# Patient Record
Sex: MALE | Race: OTHER | HISPANIC OR LATINO | Employment: FULL TIME | URBAN - METROPOLITAN AREA
[De-identification: names, ages, dates, MRNs, and addresses within clinical notes are randomized per-mention and may not be internally consistent; named-entity substitution may affect disease eponyms.]

---

## 2018-08-29 ENCOUNTER — OFFICE VISIT (OUTPATIENT)
Dept: NEUROLOGY | Facility: CLINIC | Age: 38
End: 2018-08-29
Payer: COMMERCIAL

## 2018-08-29 ENCOUNTER — TRANSCRIBE ORDERS (OUTPATIENT)
Dept: ADMINISTRATIVE | Facility: HOSPITAL | Age: 38
End: 2018-08-29

## 2018-08-29 VITALS
DIASTOLIC BLOOD PRESSURE: 85 MMHG | SYSTOLIC BLOOD PRESSURE: 147 MMHG | HEART RATE: 82 BPM | RESPIRATION RATE: 6 BRPM | WEIGHT: 315 LBS

## 2018-08-29 DIAGNOSIS — R20.0 NUMBNESS OF LEFT HAND: ICD-10-CM

## 2018-08-29 DIAGNOSIS — G56.02 CARPAL TUNNEL SYNDROME OF LEFT WRIST: ICD-10-CM

## 2018-08-29 DIAGNOSIS — M54.2 CERVICALGIA: ICD-10-CM

## 2018-08-29 DIAGNOSIS — M54.81 OCCIPITAL NEURALGIA OF RIGHT SIDE: Primary | ICD-10-CM

## 2018-08-29 PROCEDURE — 99214 OFFICE O/P EST MOD 30 MIN: CPT | Performed by: PSYCHIATRY & NEUROLOGY

## 2018-08-29 RX ORDER — METOPROLOL TARTRATE 100 MG/1
TABLET ORAL EVERY 12 HOURS
COMMUNITY
Start: 2017-12-22 | End: 2018-10-03 | Stop reason: SDUPTHER

## 2018-08-29 RX ORDER — METHOCARBAMOL 500 MG/1
TABLET, FILM COATED ORAL
COMMUNITY
Start: 2017-12-22 | End: 2018-10-03 | Stop reason: ALTCHOICE

## 2018-08-29 RX ORDER — DICYCLOMINE HCL 20 MG
TABLET ORAL EVERY 12 HOURS
COMMUNITY
Start: 2018-05-30 | End: 2018-10-03 | Stop reason: ALTCHOICE

## 2018-08-29 RX ORDER — PANTOPRAZOLE SODIUM 40 MG/1
TABLET, DELAYED RELEASE ORAL EVERY 24 HOURS
COMMUNITY
Start: 2017-12-08 | End: 2018-10-03 | Stop reason: ALTCHOICE

## 2018-08-29 NOTE — ASSESSMENT & PLAN NOTE
Following a course of physical therapy has become essentially asymptomatic  Also pleased with his current circumstance

## 2018-08-29 NOTE — LETTER
August 29, 2018     [facility], [address] [facility]    Patient: Merlinda Rudder   YOB: 1980   Date of Visit: 8/29/2018       Dear Dr Junior Reddy:    Thank you for referring Brisa Mabry to me for evaluation  Below are my notes for this consultation  If you have questions, please do not hesitate to call me  I look forward to following your patient along with you  Sincerely,        Chele Rosa MD        CC: No Recipients  Chele Rosa MD  8/29/2018  9:41 AM  Sign at close encounter  Patient is here today for a issues with headaches      Patient ID: Merlinda Rudder is a 40 y o  male  Assessment/Plan:    Occipital neuralgia of right side  Has done exceedingly well  Able to slowly taper off the gabapentin and has been now off gabapentin for 2+ months  He has had no resurgence of pain  He is pleased with his overall improvement  --remain off gabapentin  Cervicalgia  Following a course of physical therapy has become essentially asymptomatic  Also pleased with his current circumstance  Numbness of left hand  Presents today with a new complaint  He describes intermittent numbness involving his left hand  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  No fixed sensory loss nor any testable weakness at the present time and despite an absence of mechanical signs, knee considered here at least the possibility of a left carpal tunnel syndrome  --schedule nerve conduction studies  With patient on Xarelto EMG portion obviously limited  He will follow up after completion of the electrodiagnostic study  Subjective:    HPI  Patient, a 40years of age, returns to reassess the status of 2 past complaints  The 1st is that of right occipital neuralgia  At he had been on a course of gabapentin which resulted in resolution ofhis symptoms    As a result, a tapering schedule was undertaken and he has now been off gabapentin for 2+ months  He has had no resurgence symptomatic issues  The 2nd issue is that of cervicalgia  From this standpoint he has done well  Following a course of physical therapy, he has become and has remained essentially asymptomatic from that standpoint  He is very pleased with his overall response  However, today he presents with a new issue  He describes the appearance of intermittent numbness involving his left hand (predominantly thumb and index finger) 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  If he stops what he is doing and a massages his hand, the symptoms will dissipate  He describes no static sensory issues nor has he had any evolving weakness  Past Medical History:   Diagnosis Date    Atrial fibrillation ([address] )     Blood clot in vein     Cervicalgia     GERD (gastroesophageal reflux disease)     Obstructive sleep apnea     uses CPAP    Occipital neuralgia     right    Shoulder dislocation     Splenic infarction      History reviewed  No pertinent surgical history    Social History     Social History    Marital status: Single     Spouse name: N/A    Number of children: N/A    Years of education: N/A     Social History Main Topics    Smoking status: Former Smoker    Smokeless tobacco: Former User      Comment: NEVER SMOKER; NO TOBACCO USE    Alcohol use Yes      Comment: occasional     Drug use: Yes     Types: Marijuana    Sexual activity: Not Asked     Other Topics Concern    None     Social History Narrative    None     Family History   Problem Relation Age of Onset    Bleeding Disorder Mother         BLOOD DISORDER    Heart disease Paternal Grandfather         CARDIOVASCULAR DISEASE    Cerebral aneurysm Paternal Grandfather      Allergies   Allergen Reactions    No Active Allergies        Current Outpatient Prescriptions:     aspirin 81 MG tablet, Take 81 mg by mouth, Disp: , Rfl:     dicyclomine (BENTYL) 20 mg tablet, Every 12 hours, Disp: , Rfl:     methocarbamol (ROBAXIN) 500 mg tablet, take 1 tablet by oral route 4 times every day as needed, Disp: , Rfl:     metoprolol tartrate (LOPRESSOR) 100 mg tablet, Every 12 hours, Disp: , Rfl:     pantoprazole (PROTONIX) 40 mg tablet, every 24 hours, Disp: , Rfl:     rivaroxaban (XARELTO) 20 mg tablet, every 24 hours, Disp: , Rfl:     Objective:    Blood pressure 147/85, pulse 82, resp  rate (!) 6, weight (!) 207 kg (457 lb 4 8 oz)  Physical Exam  Lungs clear to auscultation  Rhythm regular  No obvious hand atrophy noted  Specific note as to no atrophy noted within the thenar eminences bilaterally  No obvious Tinel's or Phalen's maneuvers present  Adson's maneuver negative bilaterally  Neurological Exam  Alert  Pleasantly interactive  Fully oriented  Unremarkable spontaneous gait  Cranial nerves 2-12 tested and grossly intact  Accurate finger-to-nose bilaterally  Full and symmetrical strength throughout the 4 extremities including fully retained distal median motor function bilaterally  Sensory testing with pin intact throughout including the median distributions bilaterally  Muscle stretch reflexes bilaterally 1 at biceps, brachioradialis, triceps, knees and ankles  Toe response is downgoing bilaterally  ROS:    Review of Systems   Constitutional: Negative  Negative for appetite change and fever  HENT: Negative  Negative for hearing loss, tinnitus, trouble swallowing and voice change  Eyes: Negative  Negative for photophobia and pain  Respiratory: Positive for shortness of breath  Afib   Cardiovascular: Positive for palpitations  Gastrointestinal: Negative  Negative for nausea and vomiting  Endocrine: Negative  Negative for cold intolerance and heat intolerance  Genitourinary: Positive for frequency and urgency  Negative for dysuria  Musculoskeletal: Positive for back pain, gait problem, joint swelling, neck pain and neck stiffness  Negative for myalgias  Skin: Negative  Negative for rash  Allergic/Immunologic: Negative  Neurological: Negative for dizziness, tremors, seizures, syncope, facial asymmetry, speech difficulty, weakness, light-headedness, numbness (hands, legs) and headaches  Hematological: Negative  Does not bruise/bleed easily  Psychiatric/Behavioral: Positive for sleep disturbance (at times)  Negative for confusion and hallucinations  Sleep apnea uses a CPAP       I personally reviewed the ROS that was entered by the medical assistant

## 2018-08-29 NOTE — ASSESSMENT & PLAN NOTE
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

## 2018-08-29 NOTE — ASSESSMENT & PLAN NOTE
Has done exceedingly well  Able to slowly taper off the gabapentin and has been now off gabapentin for 2+ months  He has had no resurgence of pain  He is pleased with his overall improvement  --remain off gabapentin

## 2018-08-29 NOTE — PROGRESS NOTES
Patient is here today for a issues with headaches      Patient ID: Artur Zepeda is a 40 y o  male  Assessment/Plan:    Occipital neuralgia of right side  Has done exceedingly well  Able to slowly taper off the gabapentin and has been now off gabapentin for 2+ months  He has had no resurgence of pain  He is pleased with his overall improvement  --remain off gabapentin  Cervicalgia  Following a course of physical therapy has become essentially asymptomatic  Also pleased with his current circumstance  Numbness of left hand  Presents today with a new complaint  He describes intermittent numbness involving his left hand  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  No fixed sensory loss nor any testable weakness at the present time and despite an absence of mechanical signs, knee considered here at least the possibility of a left carpal tunnel syndrome  --schedule nerve conduction studies  With patient on Xarelto EMG portion obviously limited  He will follow up after completion of the electrodiagnostic study  Subjective:    HPI  Patient, a 40years of age, returns to reassess the status of 2 past complaints  The 1st is that of right occipital neuralgia  At he had been on a course of gabapentin which resulted in resolution ofhis symptoms  As a result, a tapering schedule was undertaken and he has now been off gabapentin for 2+ months  He has had no resurgence symptomatic issues  The 2nd issue is that of cervicalgia  From this standpoint he has done well  Following a course of physical therapy, he has become and has remained essentially asymptomatic from that standpoint  He is very pleased with his overall response  However, today he presents with a new issue  He describes the appearance of intermittent numbness involving his left hand (predominantly thumb and index finger) 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    If he stops what he is doing and a massages his hand, the symptoms will dissipate  He describes no static sensory issues nor has he had any evolving weakness  Past Medical History:   Diagnosis Date    Atrial fibrillation ([address] )     Blood clot in vein     Cervicalgia     GERD (gastroesophageal reflux disease)     Obstructive sleep apnea     uses CPAP    Occipital neuralgia     right    Shoulder dislocation     Splenic infarction      History reviewed  No pertinent surgical history  Social History     Social History    Marital status: Single     Spouse name: N/A    Number of children: N/A    Years of education: N/A     Social History Main Topics    Smoking status: Former Smoker    Smokeless tobacco: Former User      Comment: NEVER SMOKER; NO TOBACCO USE    Alcohol use Yes      Comment: occasional     Drug use: Yes     Types: Marijuana    Sexual activity: Not Asked     Other Topics Concern    None     Social History Narrative    None     Family History   Problem Relation Age of Onset    Bleeding Disorder Mother         BLOOD DISORDER    Heart disease Paternal Grandfather         CARDIOVASCULAR DISEASE    Cerebral aneurysm Paternal Grandfather      Allergies   Allergen Reactions    No Active Allergies        Current Outpatient Prescriptions:     aspirin 81 MG tablet, Take 81 mg by mouth, Disp: , Rfl:     dicyclomine (BENTYL) 20 mg tablet, Every 12 hours, Disp: , Rfl:     methocarbamol (ROBAXIN) 500 mg tablet, take 1 tablet by oral route 4 times every day as needed, Disp: , Rfl:     metoprolol tartrate (LOPRESSOR) 100 mg tablet, Every 12 hours, Disp: , Rfl:     pantoprazole (PROTONIX) 40 mg tablet, every 24 hours, Disp: , Rfl:     rivaroxaban (XARELTO) 20 mg tablet, every 24 hours, Disp: , Rfl:     Objective:    Blood pressure 147/85, pulse 82, resp  rate (!) 6, weight (!) 207 kg (457 lb 4 8 oz)  Physical Exam  Lungs clear to auscultation  Rhythm regular  No obvious hand atrophy noted    Specific note as to no atrophy noted within the thenar eminences bilaterally  No obvious Tinel's or Phalen's maneuvers present  Adson's maneuver negative bilaterally  Neurological Exam  Alert  Pleasantly interactive  Fully oriented  Unremarkable spontaneous gait  Cranial nerves 2-12 tested and grossly intact  Accurate finger-to-nose bilaterally  Full and symmetrical strength throughout the 4 extremities including fully retained distal median motor function bilaterally  Sensory testing with pin intact throughout including the median distributions bilaterally  Muscle stretch reflexes bilaterally 1 at biceps, brachioradialis, triceps, knees and ankles  Toe response is downgoing bilaterally  ROS:    Review of Systems   Constitutional: Negative  Negative for appetite change and fever  HENT: Negative  Negative for hearing loss, tinnitus, trouble swallowing and voice change  Eyes: Negative  Negative for photophobia and pain  Respiratory: Positive for shortness of breath  Afib   Cardiovascular: Positive for palpitations  Gastrointestinal: Negative  Negative for nausea and vomiting  Endocrine: Negative  Negative for cold intolerance and heat intolerance  Genitourinary: Positive for frequency and urgency  Negative for dysuria  Musculoskeletal: Positive for back pain, gait problem, joint swelling, neck pain and neck stiffness  Negative for myalgias  Skin: Negative  Negative for rash  Allergic/Immunologic: Negative  Neurological: Negative for dizziness, tremors, seizures, syncope, facial asymmetry, speech difficulty, weakness, light-headedness, numbness (hands, legs) and headaches  Hematological: Negative  Does not bruise/bleed easily  Psychiatric/Behavioral: Positive for sleep disturbance (at times)  Negative for confusion and hallucinations  Sleep apnea uses a CPAP       I personally reviewed the ROS that was entered by the medical assistant

## 2018-10-03 ENCOUNTER — OFFICE VISIT (OUTPATIENT)
Dept: CARDIOLOGY CLINIC | Facility: CLINIC | Age: 38
End: 2018-10-03
Payer: COMMERCIAL

## 2018-10-03 VITALS
BODY MASS INDEX: 42.66 KG/M2 | DIASTOLIC BLOOD PRESSURE: 80 MMHG | SYSTOLIC BLOOD PRESSURE: 130 MMHG | WEIGHT: 315 LBS | OXYGEN SATURATION: 97 % | HEIGHT: 72 IN | HEART RATE: 70 BPM

## 2018-10-03 DIAGNOSIS — I48.0 PAROXYSMAL ATRIAL FIBRILLATION (HCC): ICD-10-CM

## 2018-10-03 DIAGNOSIS — I48.91 ATRIAL FIBRILLATION, UNSPECIFIED TYPE (HCC): Primary | ICD-10-CM

## 2018-10-03 PROCEDURE — 93000 ELECTROCARDIOGRAM COMPLETE: CPT | Performed by: INTERNAL MEDICINE

## 2018-10-03 PROCEDURE — 99214 OFFICE O/P EST MOD 30 MIN: CPT | Performed by: INTERNAL MEDICINE

## 2018-10-03 RX ORDER — METOPROLOL TARTRATE 100 MG/1
100 TABLET ORAL EVERY 12 HOURS SCHEDULED
Qty: 60 TABLET | Refills: 5 | Status: SHIPPED | OUTPATIENT
Start: 2018-10-03 | End: 2018-11-15 | Stop reason: SDUPTHER

## 2018-10-03 NOTE — PROGRESS NOTES
[facility]    [address] [place] [address] 809, [address]   [address]  [address]  Phone#  [phone]  Fax#  [phone]  *-*-*-*-*-*-*-*-*-*-*-*-*-*-*-*-*-*-*-*-*-*-*-*-*-*-*-*-*-*-*-*-*-*-*-*-*-*-*-*-*-*-*-*-*-*-*-*-*-*-*-*-*-*    Daron Muro DATE: 10/03/18 12:36 PM    PATIENT NAME: Viktor Murray   1980    [id number]  Age: 40 y o  Sex: male    AUTHOR: Kaylee Mcclendon MD  PRIMARYCARE PHYSICIAN: Allen Gonzalez MD    DIAGNOSES:  1  Paroxysmal atrial fibrillation with episode in 2017   2  Morbid obesity  3  Obstructive sleep apnea with intermittent compliance with CPAP  4  History of gastritis/ GERD   5  History of splenic infarction  6  History of colonic diverticular disease without history of diverticulitis  7  History of shoulder dislocation  8  History of DVT in right upper extremity, which was apparently related to bee sting    Echocardiogram January 2018 showed technically poor quality study, mild approaching moderate concentric left ventricular hypertrophy, normal left ventricular systolic function, EF around 60%, mild left atrial enlargement, aortic valve sclerosis, trace mitral and tricuspid valve regurgitation, no pulmonary hypertension  Holter monitoring evaluation in June 2018 shows sinus rhythm  beats per minute  No atrial fibrillation was noted during the recording rare isolated premature atrial and premature ventricular contractions were noted  No nonsustained or sustained arrhythmia noted  Denies smoking cigarettes  Uses marijuana  he is on Xarelto for anticoagulation for history of atrial fibrillation with history of splenic infarct and history of DVT  Has had no bleeding problems  Reports that he is mostly compliant  CURRENT ECG:  Results for orders placed or performed in visit on 10/03/18   POCT ECG    Narrative    Sinus rhythm, HR 70 beats per min, normal axis intervals, no significant ST T wave abnormalities         CARDIOLOGY ASSESSMENT & PLAN:  Paroxysmal atrial fibrillation [facility])    Patient has had 2 episodes of atrial fibrillation with last episode in 2017  Holter monitor evaluation in June 2018 did not reveal any atrial fibrillation episodes  He is symptomatic [place] better with no recent palpitations  He has reports being intermittently compliant with anticoagulation  Has had no bleeding or bruising  Is on good dose of beta blockers and tolerating it  -   At this time we are continuing current medical therapy  -   As he has had no recent atrial fibrillation and as he wants to get off anticoagulation if possible, I have advised him that we can consider to put of implantable loop recorder for long-term rhythm monitoring  He will continued to be on anticoagulation and if over the next 3-6 months of following implantation he has no atrial fibrillation episodes then we can discontinue the anticoagulation     -   Have strongly  Encouraged him about his weight management  He is interested in losing weight and has been modifying his diet but this has not helped so far  He is still very hesitant and unwilling to undergo surgical intervention  I am advising him to establish care with bariatric surgery and weight management program and they can advise him nutritional help and exercises as well  -  He is agreeable to undergo loop recorder implantation  I have discussed this procedure in detail and answered all questions are  We will approach his medical insurance for prior authorization and plan for procedure  He would like to have it done on 18th of October, Thursday at 9:30 a m  He is advised to continue all his medications  INTERVAL HISTORY & HISTORY OF PRESENT ILLNESS:  Louise Pinto is here for follow-up regarding his cardiac comorbidities which include:   Episode of atrial fibrillation in 2017, morbid obesity, hypertension and sleep apnea  He reports overall feeling well from cardiac perspective   Denies any recent chest pain, shortness of breath, dizziness, lightheadedness, orthopnea, PND, pedal edema, presyncope / syncope, or decline in exercise tolerance  Also denies any recent hospitalizations or other illnesses  Reports being compliant with medications  Did have recent URI symptoms which are now resolving  Has had no recurrence of palpitation episodes since last visit  No recurrence of blood clots  Is tolerating anticoagulation therapy with Xarelto  REVIEW OF SYMPTOMS:    Positive for:    Recent URI symptoms  No significant cardiac or other symptoms  Negative for: All remaining as reviewed below and in HPI  SYSTEM SYMPTOMS REVIEWED:  General--weight change, fever, night sweats  Respirato  Cardiovascular--chest pain, syncope, dyspnea on exertion, edema, decline in exercise tolerance, claudication   Gastrointestinal--persistent vomiting, diarrhea, abdominal distention, blood in stool   Muscular or skeletal--joint pain or swelling   Neurologic--headaches, syncope, abnormal movement  Hematologic--history of easy bruising and bleeding   Endocrine--thyroid enlargement, heat or cold intolerance, polyuria   Psychiatric--anxiety, depression     *-*-*-*-*-*-*-*-*-*-*-*-*-*-*-*-*-*-*-*-*-*-*-*-*-*-*-*-*-*-*-*-*-*-*-*-*-*-*-*-*-*-*-*-*-*-*-*-*-*-*-*-*-*-  VITAL SIGNS:  Vitals:    10/03/18 1132   BP: 130/80   BP Location: Left arm   Patient Position: Sitting   Cuff Size: Extra-Large   Pulse: 70   SpO2: 97%   Weight: (!) 206 kg (454 lb)   Height: 6' (1 829 m)       *-*-*-*-*-*-*-*-*-*-*-*-*-*-*-*-*-*-*-*-*-*-*-*-*-*-*-*-*-*-*-*-*-*-*-*-*-*-*-*-*-*-*-*-*-*-*-*-*-*-*-*-*-*-  PHYSICAL EXAM:  General Appearance:    Alert, cooperative, no distress, appears stated age , morbidly obese, smells of Marijuana   Head, Eyes, ENT:    No obvious abnormality, moist mucous mebranes  Neck:   Supple, no carotid bruit or JVD   Back:     Symmetric, no curvature  Lungs:     Respirations unlabored   Clear to auscultation bilaterally,    Chest wall:    No tenderness or deformity   Heart:    Regular rate and rhythm, S1 and S2 normal, no murmur, rub  or gallop  Abdomen:     Obese, soft, non-tender, No obvious masses, or organomegaly   Extremities:   Extremities normal, no cyanosis or edema   Skin:   Skin color, texture, turgor normal, no rashes or lesions     *-*-*-*-*-*-*-*-*-*-*-*-*-*-*-*-*-*-*-*-*-*-*-*-*-*-*-*-*-*-*-*-*-*-*-*-*-*-*-*-*-*-*-*-*-*-*-*-*-*-*-*-*-*-  CURRENT MEDICATION LIST:    Current Outpatient Prescriptions:     aspirin 81 MG tablet, Take 81 mg by mouth, Disp: , Rfl:     metoprolol tartrate (LOPRESSOR) 100 mg tablet, Every 12 hours, Disp: , Rfl:     rivaroxaban (XARELTO) 20 mg tablet, every 24 hours, Disp: , Rfl:     ALLERGIES:  Allergies   Allergen Reactions    No Active Allergies        *-*-*-*-*-*-*-*-*-*-*-*-*-*-*-*-*-*-*-*-*-*-*-*-*-*-*-*-*-*-*-*-*-*-*-*-*-*-*-*-*-*-*-*-*-*-*-*-*-*-*-*-*-*-    LABORATORY DATA:  I have personally reviewed pertinent labs  No results found for: NA, K, CL, CO2, ANIONGAP, BUN, CREATININE, EGFR, GLUCOSE, CALCIUM, AST, ALT, ALKPHOS, PROT, ALBUMIN, BILITOT, MG  No results found for: WBC, HGB, PLT  No results found for: PT, PTT, INR  No results found for: CKMB, BNP, DIGOXIN  No results found for: TSH, T4, T3  No results found for: CHOL, HDL, CHOLHDLRAT, LDL, TRIG   No results found for: HGBA1C  No results found for: BLOODCX, SPUTUMCULTUR, GRAMSTAIN, URINECX, WOUNDCULT, BODYFLUIDCUL, MRSACULTURE, INFLUAPCR, INFLUBPCR, RSVPCR, LEGIONELLAUR, CDIFFTOXINB    *-*-*-*-*-*-*-*-*-*-*-*-*-*-*-*-*-*-*-*-*-*-*-*-*-*-*-*-*-*-*-*-*-*-*-*-*-*-*-*-*-*-*-*-*-*-*-*-*-*-*-*-*-*-  PREVIOUS CARDIOLOGY & RADIOLOGY RESULTS:  No results found for this or any previous visit  No results found for this or any previous visit  No results found for this or any previous visit  No results found for this or any previous visit    Xr Spine Cervical 2 Or 3 Vw Injury    Result Date: 8/9/2016  Impression: Straightening of cervical lordosis which could be positional   Otherwise unremarkable examination  Workstation performed: [id number]        *-*-*-*-*-*-*-*-*-*-*-*-*-*-*-*-*-*-*-*-*-*-*-*-*-*-*-*-*-*-*-*-*-*-*-*-*-*-*-*-*-*-*-*-*-*-*-*-*-*-*-*-*-*-  SIGNATURES:   @JIU@   Katrin Sneed MD     CC:   Glenis Villela MD   No ref  provider found   *-*-*-*-*-*-*-*-*-*-*-*-*-*-*-*-*-*-*-*-*-*-*-*-*-*-*-*-*-*-*-*-*-*-*-*-*-*-*-*-*-*-*-*-*-*-*-*-*-*-*-*-*-*-    Social History     Social History    Marital status: Single     Spouse name: N/A    Number of children: N/A    Years of education: N/A     Occupational History    Not on file  Social History Main Topics    Smoking status: Former Smoker    Smokeless tobacco: Former User      Comment: NEVER SMOKER; NO TOBACCO USE    Alcohol use Yes      Comment: occasional     Drug use: Yes     Types: Marijuana    Sexual activity: Not on file     Other Topics Concern    Not on file     Social History Narrative    No narrative on file      Family History   Problem Relation Age of Onset    Bleeding Disorder Mother         BLOOD DISORDER    Heart disease Paternal Grandfather         CARDIOVASCULAR DISEASE    Cerebral aneurysm Paternal Grandfather      No past surgical history on file

## 2018-10-03 NOTE — ASSESSMENT & PLAN NOTE
Patient has had 2 episodes of atrial fibrillation with last episode in 2017  Holter monitor evaluation in June 2018 did not reveal any atrial fibrillation episodes  He is symptomatic [place] better with no recent palpitations  He has reports being intermittently compliant with anticoagulation  Has had no bleeding or bruising  Is on good dose of beta blockers and tolerating it  -   At this time we are continuing current medical therapy  -   As he has had no recent atrial fibrillation and as he wants to get off anticoagulation if possible, I have advised him that we can consider to put of implantable loop recorder for long-term rhythm monitoring  He will continued to be on anticoagulation and if over the next 3-6 months of following implantation he has no atrial fibrillation episodes then we can discontinue the anticoagulation     -   Have strongly  Encouraged him about his weight management  He is interested in losing weight and has been modifying his diet but this has not helped so far  He is still very hesitant and unwilling to undergo surgical intervention  I am advising him to establish care with bariatric surgery and weight management program and they can advise him nutritional help and exercises as well  -  He is agreeable to undergo loop recorder implantation  I have discussed this procedure in detail and answered all questions are  We will approach his medical insurance for prior authorization and plan for procedure  He would like to have it done on 18th of October, Thursday at 9:30 a m  He is advised to continue all his medications

## 2018-10-03 NOTE — PATIENT INSTRUCTIONS
CARDIOLOGY ASSESSMENT & PLAN:  Paroxysmal atrial fibrillation [facility])    Patient has had 2 episodes of atrial fibrillation with last episode in 2017  Holter monitor evaluation in June 2018 did not reveal any atrial fibrillation episodes  He is symptomatic [place] better with no recent palpitations  He has reports being intermittently compliant with anticoagulation  Has had no bleeding or bruising  Is on good dose of beta blockers and tolerating it  -   At this time we are continuing current medical therapy  -   As he has had no recent atrial fibrillation and as he wants to get off anticoagulation if possible, I have advised him that we can consider to put of implantable loop recorder for long-term rhythm monitoring  He will continued to be on anticoagulation and if over the next 3-6 months of following implantation he has no atrial fibrillation episodes then we can discontinue the anticoagulation     -   Have strongly  Encouraged him about his weight management  He is interested in losing weight and has been modifying his diet but this has not helped so far  He is still very hesitant and unwilling to undergo surgical intervention  I am advising him to establish care with bariatric surgery and weight management program and they can advise him nutritional help and exercises as well  -  He is agreeable to undergo loop recorder implantation  I have discussed this procedure in detail and answered all questions are  We will approach his medical insurance for prior authorization and plan for procedure  He would like to have it done on 18th of October, Thursday at 9:30 a m  He is advised to continue all his medications

## 2018-10-05 ENCOUNTER — TELEPHONE (OUTPATIENT)
Dept: CARDIOLOGY CLINIC | Facility: CLINIC | Age: 38
End: 2018-10-05

## 2018-10-05 NOTE — TELEPHONE ENCOUNTER
Called [facility] for implanted loop recorder(00060) for 10 18 18 at [facility] with Dr Emily Maxwell precert need as long as OP procedure verification use   Adiel Alston Payment   [email or web address]

## 2018-11-15 ENCOUNTER — TRANSCRIBE ORDERS (OUTPATIENT)
Dept: ADMINISTRATIVE | Facility: HOSPITAL | Age: 38
End: 2018-11-15

## 2018-11-15 DIAGNOSIS — I48.91 ATRIAL FIBRILLATION, UNSPECIFIED TYPE (HCC): Primary | ICD-10-CM

## 2018-11-15 DIAGNOSIS — R20.0 NUMBNESS OF LEFT HAND: Primary | ICD-10-CM

## 2018-11-15 RX ORDER — METOPROLOL TARTRATE 100 MG/1
100 TABLET ORAL EVERY 12 HOURS SCHEDULED
Qty: 60 TABLET | Refills: 4 | Status: SHIPPED | OUTPATIENT
Start: 2018-11-15 | End: 2020-06-11

## 2018-12-11 ENCOUNTER — OFFICE VISIT (OUTPATIENT)
Dept: CARDIOLOGY CLINIC | Facility: CLINIC | Age: 38
End: 2018-12-11
Payer: COMMERCIAL

## 2018-12-11 VITALS
WEIGHT: 315 LBS | HEIGHT: 72 IN | SYSTOLIC BLOOD PRESSURE: 144 MMHG | BODY MASS INDEX: 42.66 KG/M2 | DIASTOLIC BLOOD PRESSURE: 90 MMHG | OXYGEN SATURATION: 96 % | HEART RATE: 94 BPM

## 2018-12-11 DIAGNOSIS — E66.01 MORBID OBESITY WITH BODY MASS INDEX OF 40.0-49.9 (HCC): ICD-10-CM

## 2018-12-11 DIAGNOSIS — I48.0 PAROXYSMAL ATRIAL FIBRILLATION (HCC): ICD-10-CM

## 2018-12-11 DIAGNOSIS — I48.91 ATRIAL FIBRILLATION, UNSPECIFIED TYPE (HCC): Primary | ICD-10-CM

## 2018-12-11 DIAGNOSIS — I10 ESSENTIAL HYPERTENSION, BENIGN: ICD-10-CM

## 2018-12-11 PROCEDURE — 93000 ELECTROCARDIOGRAM COMPLETE: CPT | Performed by: INTERNAL MEDICINE

## 2018-12-11 PROCEDURE — 99214 OFFICE O/P EST MOD 30 MIN: CPT | Performed by: INTERNAL MEDICINE

## 2018-12-11 RX ORDER — LISINOPRIL 5 MG/1
5 TABLET ORAL DAILY
Qty: 30 TABLET | Refills: 5 | Status: SHIPPED | OUTPATIENT
Start: 2018-12-11 | End: 2020-06-11

## 2018-12-11 NOTE — PATIENT INSTRUCTIONS
CARDIOLOGY ASSESSMENT & PLAN:  Paroxysmal atrial fibrillation (HCC)  Last documented episode of atrial fibrillation was in 2017  Holter monitor in June 2018 did not show any AFib episodes  However given his history of prior DVT and because of his body habitus and left atrial enlargement and left ventricular hypertrophy he is at increased risk of paroxysmal atrial fibrillation  We had considered putting a loop recorder and discontinuing Xarelto if he has no atrial fibrillation over course of time however in the end we decided against it because as long as his weight does not improve dramatically he would still be at increased risk of having atrial fibrillation  He has lost significant weight but needs to lose more  His blood pressure is noted to be elevated  He continues to smoke marijuana  - at this time we will continue his metoprolol and add lisinopril 5 mg daily for additional blood pressure control   - I have reinforced him and encouraged him to continue to lose weight and eat healthy and cut down on marijuana more  - he is not interested in meeting the weight management/bariatric program   Hope we are sending a referral to [facility] weight management program   - requesting routine blood work including thyroid function test, CBC and CMP as he has not had in the recent months  Essential hypertension, benign  Blood pressure noted to be elevated  Some concerns about compliance  Plan as outlined above  DIAGNOSES:  1  Paroxysmal atrial fibrillation with episode in 2017   2  Morbid obesity  3  Obstructive sleep apnea with intermittent compliance with CPAP  4  History of gastritis/ GERD   5  History of splenic infarction  6  History of colonic diverticular disease without history of diverticulitis  7  History of shoulder dislocation  8   History of DVT in right upper extremity, which was apparently related to bee sting    Echocardiogram January 2018 showed technically poor quality study, mild approaching moderate concentric left ventricular hypertrophy, normal left ventricular systolic function, EF around 60%, mild left atrial enlargement, aortic valve sclerosis, trace mitral and tricuspid valve regurgitation, no pulmonary hypertension  Holter monitoring evaluation in June 2018 shows sinus rhythm  beats per minute  No atrial fibrillation was noted during the recording rare isolated premature atrial and premature ventricular contractions were noted  No nonsustained or sustained arrhythmia noted  Continues to smoke and use marijuana  he is on Xarelto for anticoagulation for history of atrial fibrillation with history of splenic infarct and history of DVT  Has had no bleeding problems  Reports that he is mostly compliant  CURRENT ECG:  Results for orders placed or performed in visit on 12/11/18   POCT ECG    Narrative    Sinus rhythm, HR 94 bpm, normal axis and intervals, no significant ST T wave abnormalities

## 2018-12-11 NOTE — PROGRESS NOTES
[address]    [address], [address]  [address]  Phone#  [phone]  Fax#  [phone]  *-*-*-*-*-*-*-*-*-*-*-*-*-*-*-*-*-*-*-*-*-*-*-*-*-*-*-*-*-*-*-*-*-*-*-*-*-*-*-*-*-*-*-*-*-*-*-*-*-*-*-*-*-*    Ken Velasquez DATE: 12/11/18 4:44 PM    PATIENT NAME: Pato Felix   1980    [id number]  Age: 41 y o  Sex: male    AUTHOR: Kaylee Mcclendon MD  PRIMARYCARE PHYSICIAN: Lc Faulkner MD    DIAGNOSES:  1  Paroxysmal atrial fibrillation with episode in 2017   2  Morbid obesity  3  Obstructive sleep apnea with intermittent compliance with CPAP  4  History of gastritis/ GERD   5  History of splenic infarction  6  History of colonic diverticular disease without history of diverticulitis  7  History of shoulder dislocation  8  History of DVT in right upper extremity, which was apparently related to bee sting    Echocardiogram January 2018 showed technically poor quality study, mild approaching moderate concentric left ventricular hypertrophy, normal left ventricular systolic function, EF around 60%, mild left atrial enlargement, aortic valve sclerosis, trace mitral and tricuspid valve regurgitation, no pulmonary hypertension  Holter monitoring evaluation in June 2018 shows sinus rhythm  beats per minute  No atrial fibrillation was noted during the recording rare isolated premature atrial and premature ventricular contractions were noted  No nonsustained or sustained arrhythmia noted  Continues to smoke and use marijuana  he is on Xarelto for anticoagulation for history of atrial fibrillation with history of splenic infarct and history of DVT  Has had no bleeding problems  Reports that he is mostly compliant  CURRENT ECG:  Results for orders placed or performed in visit on 12/11/18   POCT ECG    Narrative    Sinus rhythm, HR 94 bpm, normal axis and intervals, no significant ST T wave abnormalities         CARDIOLOGY ASSESSMENT & PLAN:  Paroxysmal atrial fibrillation ([facility] [address] )  Last documented episode of atrial fibrillation was in 2017  Holter monitor in June 2018 did not show any AFib episodes  However given his history of prior DVT and because of his body habitus and left atrial enlargement and left ventricular hypertrophy he is at increased risk of paroxysmal atrial fibrillation  We had considered putting a loop recorder and discontinuing Xarelto if he has no atrial fibrillation over course of time however in the end we decided against it because as long as his weight does not improve dramatically he would still be at increased risk of having atrial fibrillation  He has lost significant weight but needs to lose more  His blood pressure is noted to be elevated  He continues to smoke marijuana  - at this time we will continue his metoprolol and add lisinopril 5 mg daily for additional blood pressure control   - I have reinforced him and encouraged him to continue to lose weight and eat healthy and cut down on marijuana more  - he is not interested in meeting the weight management/bariatric program   Hope we are sending a referral to [facility] management program   - requesting routine blood work including thyroid function test, CBC and CMP as he has not had in the recent months  Essential hypertension, benign  Blood pressure noted to be elevated  Some concerns about compliance  Plan as outlined above  INTERVAL HISTORY & HISTORY OF PRESENT ILLNESS:  Carrie Harrison is here for follow-up regarding his cardiac comorbidities which include:   Episode of atrial fibrillation in 2017, morbid obesity, hypertension and sleep apnea  He reports overall feeling well from cardiac perspective  There have been no recent active symptoms of chest discomfort or exertional angina  There is no dyspnea with usual activities or exertion  No orthopnea, PND or pedal edema     No palpitations, lightheadedness, presyncope, or syncope  Denies any recent hospitalizations or other illnesses  Reports being compliant with medications  Or      Has had no recurrence of palpitation episodes since last visit  No recurrence of blood clots  Is tolerating anticoagulation therapy with Xarelto  REVIEW OF SYMPTOMS:    Positive for:  No significant cardiac or other symptoms  Negative for: All remaining as reviewed below and in HPI  SYSTEM SYMPTOMS REVIEWED:  General--weight change, fever, night sweats  Respirato  Cardiovascular--chest pain, syncope, dyspnea on exertion, edema, decline in exercise tolerance, claudication   Gastrointestinal--persistent vomiting, diarrhea, abdominal distention, blood in stool   Muscular or skeletal--joint pain or swelling   Neurologic--headaches, syncope, abnormal movement  Hematologic--history of easy bruising and bleeding   Endocrine--thyroid enlargement, heat or cold intolerance, polyuria   Psychiatric--anxiety, depression     *-*-*-*-*-*-*-*-*-*-*-*-*-*-*-*-*-*-*-*-*-*-*-*-*-*-*-*-*-*-*-*-*-*-*-*-*-*-*-*-*-*-*-*-*-*-*-*-*-*-*-*-*-*-  VITAL SIGNS:  Vitals:    12/11/18 1554   BP: 144/90   BP Location: Left arm   Patient Position: Sitting   Cuff Size: Large   Pulse: 94   SpO2: 96%   Weight: (!) 196 kg (433 lb 3 2 oz)   Height: 6' (1 829 m)     Reports that he has not taken his medications today  *-*-*-*-*-*-*-*-*-*-*-*-*-*-*-*-*-*-*-*-*-*-*-*-*-*-*-*-*-*-*-*-*-*-*-*-*-*-*-*-*-*-*-*-*-*-*-*-*-*-*-*-*-*-  PHYSICAL EXAM:  General Appearance:    Alert, cooperative, no distress, appears stated age , morbidly obese, smells of Marijuana   Head, Eyes, ENT:    No obvious abnormality, moist mucous mebranes  Neck:   Supple, no carotid bruit or JVD   Back:     Symmetric, no curvature  Lungs:     Respirations unlabored  Clear to auscultation bilaterally,    Chest wall:    No tenderness or deformity   Heart:    Regular rate and rhythm, S1 and S2 normal, no murmur, rub  or gallop     Abdomen:     Obese, soft, non-tender, No obvious masses, or organomegaly   Extremities:   Extremities normal, no cyanosis or edema   Skin:   Skin color, texture, turgor normal, no rashes or lesions     *-*-*-*-*-*-*-*-*-*-*-*-*-*-*-*-*-*-*-*-*-*-*-*-*-*-*-*-*-*-*-*-*-*-*-*-*-*-*-*-*-*-*-*-*-*-*-*-*-*-*-*-*-*-  CURRENT MEDICATION LIST:    Current Outpatient Prescriptions:     aspirin 81 MG tablet, Take 81 mg by mouth, Disp: , Rfl:     metoprolol tartrate (LOPRESSOR) 100 mg tablet, Take 1 tablet (100 mg total) by mouth every 12 (twelve) hours, Disp: 60 tablet, Rfl: 4    rivaroxaban (XARELTO) 20 mg tablet, Take 1 tablet (20 mg total) by mouth daily with breakfast, Disp: 30 tablet, Rfl: 4    lisinopril (ZESTRIL) 5 mg tablet, Take 1 tablet (5 mg total) by mouth daily, Disp: 30 tablet, Rfl: 5    ALLERGIES:  Allergies   Allergen Reactions    No Active Allergies        *-*-*-*-*-*-*-*-*-*-*-*-*-*-*-*-*-*-*-*-*-*-*-*-*-*-*-*-*-*-*-*-*-*-*-*-*-*-*-*-*-*-*-*-*-*-*-*-*-*-*-*-*-*-    LABORATORY DATA:  I have personally reviewed pertinent labs  No results found for: NA, K, CL, CO2, ANIONGAP, BUN, CREATININE, EGFR, GLUCOSE, CALCIUM, AST, ALT, ALKPHOS, PROT, BILITOT, MG  No results found for: WBC, HGB, PLT  No results found for: PT, PTT, INR  No results found for: CKMB, BNP, DIGOXIN  No results found for: TSH  No results found for: CHOL, HDL, LDL, TRIG   No results found for: HGBA1C  No results found for: BLOODCX, SPUTUMCULTUR, GRAMSTAIN, URINECX, WOUNDCULT, BODYFLUIDCUL, MRSACULTURE, INFLUAPCR, INFLUBPCR, RSVPCR, LEGIONELLAUR, CDIFFTOXINB    *-*-*-*-*-*-*-*-*-*-*-*-*-*-*-*-*-*-*-*-*-*-*-*-*-*-*-*-*-*-*-*-*-*-*-*-*-*-*-*-*-*-*-*-*-*-*-*-*-*-*-*-*-*-  PREVIOUS CARDIOLOGY & RADIOLOGY RESULTS:  No results found for this or any previous visit  No results found for this or any previous visit  No results found for this or any previous visit  No results found for this or any previous visit    Xr Spine Cervical 2 Or 3 Vw Injury    Result Date: 8/9/2016  Impression: Straightening of cervical lordosis which could be positional   Otherwise unremarkable examination  Workstation performed: [id number]        *-*-*-*-*-*-*-*-*-*-*-*-*-*-*-*-*-*-*-*-*-*-*-*-*-*-*-*-*-*-*-*-*-*-*-*-*-*-*-*-*-*-*-*-*-*-*-*-*-*-*-*-*-*-  SIGNATURES:   [unfilled]   Kaylee Mcclendon MD     CC:   MD Aneudy Ayala MD   *-*-*-*-*-*-*-*-*-*-*-*-*-*-*-*-*-*-*-*-*-*-*-*-*-*-*-*-*-*-*-*-*-*-*-*-*-*-*-*-*-*-*-*-*-*-*-*-*-*-*-*-*-*-    Social History     Social History    Marital status: Single     Spouse name: N/A    Number of children: N/A    Years of education: N/A     Occupational History    Not on file  Social History Main Topics    Smoking status: Former Smoker    Smokeless tobacco: Former User      Comment: NEVER SMOKER; NO TOBACCO USE    Alcohol use Yes      Comment: occasional     Drug use: Yes     Types: Marijuana    Sexual activity: Not on file     Other Topics Concern    Not on file     Social History Narrative    No narrative on file      Family History   Problem Relation Age of Onset    Bleeding Disorder Mother         BLOOD DISORDER    Heart disease Paternal Grandfather         CARDIOVASCULAR DISEASE    Cerebral aneurysm Paternal Grandfather      No past surgical history on file

## 2018-12-11 NOTE — ASSESSMENT & PLAN NOTE
Last documented episode of atrial fibrillation was in 2017  Holter monitor in June 2018 did not show any AFib episodes  However given his history of prior DVT and because of his body habitus and left atrial enlargement and left ventricular hypertrophy he is at increased risk of paroxysmal atrial fibrillation  We had considered putting a loop recorder and discontinuing Xarelto if he has no atrial fibrillation over course of time however in the end we decided against it because as long as his weight does not improve dramatically he would still be at increased risk of having atrial fibrillation  He has lost significant weight but needs to lose more  His blood pressure is noted to be elevated  He continues to smoke marijuana  - at this time we will continue his metoprolol and add lisinopril 5 mg daily for additional blood pressure control   - I have reinforced him and encouraged him to continue to lose weight and eat healthy and cut down on marijuana more  - he is not interested in meeting the weight management/bariatric program   Hope we are sending a referral to  [facility] weight management program   - requesting routine blood work including thyroid function test, CBC and CMP as he has not had in the recent months

## 2019-03-05 ENCOUNTER — TRANSCRIBE ORDERS (OUTPATIENT)
Dept: ADMINISTRATIVE | Facility: HOSPITAL | Age: 39
End: 2019-03-05

## 2019-03-05 ENCOUNTER — HOSPITAL ENCOUNTER (OUTPATIENT)
Dept: NEUROLOGY | Facility: HOSPITAL | Age: 39
Discharge: HOME | End: 2019-03-05
Payer: COMMERCIAL

## 2019-03-05 DIAGNOSIS — R20.0 NUMBNESS OF LEFT HAND: ICD-10-CM

## 2019-03-05 PROCEDURE — 95913 NRV CNDJ TEST 13/> STUDIES: CPT | Performed by: PSYCHIATRY & NEUROLOGY

## 2019-03-05 PROCEDURE — 95885 MUSC TST DONE W/NERV TST LIM: CPT | Performed by: PSYCHIATRY & NEUROLOGY

## 2019-03-05 NOTE — PROCEDURES
[facility]  Electromyography and Nerve Conduction Study      Patient Name:  David Zamora  MRN: [id number]   :  1980 Date performed: 3/5/2019    Age: 41 y o  Consult request by: Scot Kirkpatrick MD      HISTORY:  David Zamora is a 41 y o  male who complains of numbness and tingling in his hands bilaterally, left much worse than right  This has been present for a little less than 1 year  On the left, the paresthesias affect the dorsum of the hand as well as the 3rd through 5th digits  On the right, the paresthesias affect the 5th digit  The paresthesias are present intermittently  They do not occur nocturnally  The "flick sign" is positive  When questioned, the patient admits to neck pain that does not radiate anywhere  He denies upper extremity weakness  He admits to numbness in 1 of his great toes but otherwise denies numbness or tingling in his feet or his toes  He is referred to determine the etiology of his symptoms  Past Medical History:   Diagnosis Date    Atrial fibrillation ([facility] [address] )     Blood clot in vein     Cervicalgia     GERD (gastroesophageal reflux disease)     Obstructive sleep apnea     uses CPAP    Occipital neuralgia     right    Shoulder dislocation     Splenic infarction          Current Outpatient Medications:     aspirin 81 MG tablet, Take 81 mg by mouth, Disp: , Rfl:     lisinopril (ZESTRIL) 5 mg tablet, Take 1 tablet (5 mg total) by mouth daily, Disp: 30 tablet, Rfl: 5    metoprolol tartrate (LOPRESSOR) 100 mg tablet, Take 1 tablet (100 mg total) by mouth every 12 (twelve) hours, Disp: 60 tablet, Rfl: 4    rivaroxaban (XARELTO) 20 mg tablet, Take 1 tablet (20 mg total) by mouth daily with breakfast, Disp: 30 tablet, Rfl: 4    PHYSICAL EXAMINATION:  In general, he was in no acute distress  Upper extremity power was grade 5 bilaterally, including thumb abduction bilaterally    In both upper extremities, pinprick sensation was equivalent between the 2nd and 5th digits  Phalen's maneuver was negative bilaterally  Upper extremity reflexes were bilaterally symmetric and within normal limits  RESULTS:  EMG/NCS data and waveforms that were performed for this study have been scanned into Epic  Please refer to scanned document for waveforms  IMPRESSION:   This is an abnormal study  There is clinical and electrophysiologic evidence of a mild median neuropathy at the left wrist (e g  carpal tunnel syndrome), without electrophysiologic evidence of motor or sensory axon loss  There is no electrophysiologic evidence of a median neuropathy at the right wrist, despite detailed testing  Clinical correlation is advised      Jina Cabrera MD

## 2019-04-03 ENCOUNTER — CONSULT (OUTPATIENT)
Dept: BARIATRICS | Facility: CLINIC | Age: 39
End: 2019-04-03
Payer: COMMERCIAL

## 2019-04-03 VITALS
BODY MASS INDEX: 42.66 KG/M2 | WEIGHT: 315 LBS | SYSTOLIC BLOOD PRESSURE: 130 MMHG | RESPIRATION RATE: 20 BRPM | TEMPERATURE: 98.4 F | DIASTOLIC BLOOD PRESSURE: 100 MMHG | HEIGHT: 72 IN | HEART RATE: 80 BPM

## 2019-04-03 DIAGNOSIS — I48.0 PAROXYSMAL ATRIAL FIBRILLATION (HCC): ICD-10-CM

## 2019-04-03 DIAGNOSIS — I10 HYPERTENSION, BENIGN: ICD-10-CM

## 2019-04-03 DIAGNOSIS — I42.9 SECONDARY CARDIOMYOPATHY (HCC): ICD-10-CM

## 2019-04-03 DIAGNOSIS — G47.33 OSA (OBSTRUCTIVE SLEEP APNEA): ICD-10-CM

## 2019-04-03 DIAGNOSIS — E66.01 MORBID OBESITY WITH BODY MASS INDEX OF 40.0-49.9 (HCC): Primary | ICD-10-CM

## 2019-04-03 DIAGNOSIS — K21.9 GASTROESOPHAGEAL REFLUX DISEASE, ESOPHAGITIS PRESENCE NOT SPECIFIED: ICD-10-CM

## 2019-04-03 PROBLEM — D73.5 SPLENIC INFARCTION: Status: ACTIVE | Noted: 2018-05-30

## 2019-04-03 PROCEDURE — 99243 OFF/OP CNSLTJ NEW/EST LOW 30: CPT | Performed by: FAMILY MEDICINE

## 2019-10-24 ENCOUNTER — OFFICE VISIT (OUTPATIENT)
Dept: URGENT CARE | Age: 39
End: 2019-10-24
Payer: COMMERCIAL

## 2019-10-24 VITALS
SYSTOLIC BLOOD PRESSURE: 179 MMHG | TEMPERATURE: 99.2 F | HEIGHT: 70 IN | HEART RATE: 90 BPM | RESPIRATION RATE: 18 BRPM | BODY MASS INDEX: 45.1 KG/M2 | OXYGEN SATURATION: 95 % | DIASTOLIC BLOOD PRESSURE: 98 MMHG | WEIGHT: 315 LBS

## 2019-10-24 DIAGNOSIS — H66.91 RIGHT OTITIS MEDIA, UNSPECIFIED OTITIS MEDIA TYPE: Primary | ICD-10-CM

## 2019-10-24 DIAGNOSIS — I10 ESSENTIAL HYPERTENSION: ICD-10-CM

## 2019-10-24 PROCEDURE — 99284 EMERGENCY DEPT VISIT MOD MDM: CPT | Performed by: NURSE PRACTITIONER

## 2019-10-24 PROCEDURE — 99204 OFFICE O/P NEW MOD 45 MIN: CPT | Performed by: NURSE PRACTITIONER

## 2019-10-24 PROCEDURE — G0383 LEV 4 HOSP TYPE B ED VISIT: HCPCS | Performed by: NURSE PRACTITIONER

## 2019-10-24 RX ORDER — AMOXICILLIN AND CLAVULANATE POTASSIUM 875; 125 MG/1; MG/1
1 TABLET, FILM COATED ORAL EVERY 12 HOURS SCHEDULED
Qty: 14 TABLET | Refills: 0 | Status: SHIPPED | OUTPATIENT
Start: 2019-10-24 | End: 2019-10-31

## 2019-10-24 NOTE — PATIENT INSTRUCTIONS
Take zyrtec, allegra, or Claritin daily  Use flonase 1-2 sprays in each nare daily   Use nasal saline to the nose,   Use humidifer in room  Symptoms worsen go to ER  Rest    Follow up with pcp  Spoke about his elevated BP today, increased weight gain of 20 pounds since 12/2018, and severity of his comorbidities  Pt aware to follow up with his pcp, cardiologist and go to the ER if symptoms of HA, dizziness, CP and SOB occur  Otitis Media   WHAT YOU NEED TO KNOW:   Otitis media is an ear infection  DISCHARGE INSTRUCTIONS:   Medicines:  · Ibuprofen or acetaminophen  helps decrease your pain and fever  They are available without a doctor's order  Ask your healthcare provider which medicine is right for you  Ask how much to take and how often to take it  These medicines can cause stomach bleeding if not taken correctly  Ibuprofen can cause kidney damage  Do not take ibuprofen if you have kidney disease, an ulcer, or allergies to aspirin  Acetaminophen can cause liver damage  Do not drink alcohol if you take acetaminophen  · Ear drops  help treat your ear pain  · Antibiotics  help treat a bacterial infection that caused your ear infection  · Take your medicine as directed  Contact your healthcare provider if you think your medicine is not helping or if you have side effects  Tell him or her if you are allergic to any medicine  Keep a list of the medicines, vitamins, and herbs you take  Include the amounts, and when and why you take them  Bring the list or the pill bottles to follow-up visits  Carry your medicine list with you in case of an emergency  Heat or ice:   · Heat  may be used to decrease your pain  Place a warm, moist washcloth on your ear  Apply for 15 to 20 minutes, 3 to 4 times a day    · Ice  helps decrease swelling and pain  Use an ice pack or put crushed ice in a plastic bag   Cover the ice pack with a towel and place it on your ear for 15 to 20 minutes, 3 to 4 times a day for 2 days   Prevent otitis media:   · Wash your hands often  Use soap and water  Wash your hands after you use the bathroom, change a child's diapers, or sneeze  Wash your hands before you prepare or eat food  · Stay away from people who are ill  Some germs are easily and quickly spread through contact  Return to work or school: You may return to work or school when your fever is gone  Follow up with your healthcare provider as directed:  Write down your questions so you remember to ask them during your visits  Contact your healthcare provider if:   · Your ear pain gets worse or does not go away, even after treatment  · The outside of your ear is red or swollen  · You have vomiting or diarrhea  · You have fluid coming from your ear  · You have questions or concerns about your condition or care  Return to the emergency department if:   · You have a seizure  · You have a fever and a stiff neck  © 2017 [employer]  Information is for End User's use only and may not be sold, redistributed or otherwise used for commercial purposes  All illustrations and images included in CareNotes® are the copyrighted property of [employer]  or [employer]  The above information is an  only  It is not intended as medical advice for individual conditions or treatments  Talk to your doctor, nurse or pharmacist before following any medical regimen to see if it is safe and effective for you  We offer over-the-counter meds that can be purchased here at the office for your convenience       Cough and Cold Meds:   Mucinex for $14 00   Mucinex DM for $14 00   Delsym for $14 dollars   Cepacol Extra strength for $4 00,     Allergy Medicine  Bendaryl for $4 00  Cetrizine (Zyrtec) for $4 00,     Pain Relief  Ibuprofen (motrin) for $4 00  Acetaminophen (tylenol) for $4 00  Childrens tylenol and motrin for $4 00    Itch and Rash Relief  % Hydrocortisone Cream for $4 00

## 2019-10-24 NOTE — PROGRESS NOTES
NAME: Yahaira Braun is a 46 y o  male  : 1980    MRN: [id number]    BP (!) 179/98   Pulse 90   Temp 99 2 °F (37 3 °C)   Resp 18   Ht 5' 10" (1 778 m)   Wt (!) 206 kg (454 lb)   SpO2 95%   BMI 65 14 kg/m²     Assessment and Plan   Right otitis media, unspecified otitis media type [H66 91]  1  Right otitis media, unspecified otitis media type  amoxicillin-clavulanate (AUGMENTIN) 875-125 mg per tablet   2  Essential hypertension         Edwige Su was seen today for earache  Diagnoses and all orders for this visit:    Right otitis media, unspecified otitis media type  -     amoxicillin-clavulanate (AUGMENTIN) 875-125 mg per tablet; Take 1 tablet by mouth every 12 (twelve) hours for 7 days    Essential hypertension        Patient Instructions   Patient Instructions   Take zyrtec, allegra, or Claritin daily  Use flonase 1-2 sprays in each nare daily   Use nasal saline to the nose,   Use humidifer in room  Symptoms worsen go to ER  Rest    Follow up with pcp  Spoke about his elevated BP today, increased weight gain of 20 pounds since 2018, and severity of his comorbidities  Pt aware to follow up with his pcp, cardiologist and go to the ER if symptoms of HA, dizziness, CP and SOB occur  Otitis Media   WHAT YOU NEED TO KNOW:   Otitis media is an ear infection  DISCHARGE INSTRUCTIONS:   Medicines:  · Ibuprofen or acetaminophen  helps decrease your pain and fever  They are available without a doctor's order  Ask your healthcare provider which medicine is right for you  Ask how much to take and how often to take it  These medicines can cause stomach bleeding if not taken correctly  Ibuprofen can cause kidney damage  Do not take ibuprofen if you have kidney disease, an ulcer, or allergies to aspirin  Acetaminophen can cause liver damage  Do not drink alcohol if you take acetaminophen  · Ear drops  help treat your ear pain      · Antibiotics  help treat a bacterial infection that caused your ear infection  · Take your medicine as directed  Contact your healthcare provider if you think your medicine is not helping or if you have side effects  Tell him or her if you are allergic to any medicine  Keep a list of the medicines, vitamins, and herbs you take  Include the amounts, and when and why you take them  Bring the list or the pill bottles to follow-up visits  Carry your medicine list with you in case of an emergency  Heat or ice:   · Heat  may be used to decrease your pain  Place a warm, moist washcloth on your ear  Apply for 15 to 20 minutes, 3 to 4 times a day    · Ice  helps decrease swelling and pain  Use an ice pack or put crushed ice in a plastic bag  Cover the ice pack with a towel and place it on your ear for 15 to 20 minutes, 3 to 4 times a day for 2 days  Prevent otitis media:   · Wash your hands often  Use soap and water  Wash your hands after you use the bathroom, change a child's diapers, or sneeze  Wash your hands before you prepare or eat food  · Stay away from people who are ill  Some germs are easily and quickly spread through contact  Return to work or school: You may return to work or school when your fever is gone  Follow up with your healthcare provider as directed:  Write down your questions so you remember to ask them during your visits  Contact your healthcare provider if:   · Your ear pain gets worse or does not go away, even after treatment  · The outside of your ear is red or swollen  · You have vomiting or diarrhea  · You have fluid coming from your ear  · You have questions or concerns about your condition or care  Return to the emergency department if:   · You have a seizure  · You have a fever and a stiff neck  © 2017 [employer] Information is for End User's use only and may not be sold, redistributed or otherwise used for commercial purposes   All illustrations and images included in CareNotes® are the copyrighted property of [employer]  or [employer] Burrows  The above information is an  only  It is not intended as medical advice for individual conditions or treatments  Talk to your doctor, nurse or pharmacist before following any medical regimen to see if it is safe and effective for you  We offer over-the-counter meds that can be purchased here at the office for your convenience  Cough and Cold Meds:   Mucinex for $14 00   Mucinex DM for $14 00   Delsym for $14 dollars   Cepacol Extra strength for $4 00,     Allergy Medicine  Bendaryl for $4 00  Cetrizine (Zyrtec) for $4 00,     Pain Relief  Ibuprofen (motrin) for $4 00  Acetaminophen (tylenol) for $4 00  Childrens tylenol and motrin for $4 00    Itch and Rash Relief  % Hydrocortisone Cream for $4 00        Proceed to ER if symptoms worsen  Chief Complaint     Chief Complaint   Patient presents with   Will Marks     pt reports right ear pain that started 1 hour ago  Pain radiates down neck  Pt did not treat w/ OTC pain rlievers  History of Present Illness     42 yo male here today for ear pain that started one hr ago  He has had a cold for the last past few days and has taken nothing for the symptoms  He has had ear infections in the past and last one being years ago  Earache    There is pain in the right ear  This is a new problem  The current episode started today  The problem occurs constantly (for the past one hr)  The problem has been rapidly worsening  There has been no fever  The pain is at a severity of 9/10  The pain is severe  Associated symptoms include headaches  Pertinent negatives include no coughing, diarrhea, ear discharge, neck pain, rash or sore throat  He has tried acetaminophen for the symptoms  The treatment provided no relief  There is no history of a chronic ear infection, hearing loss or a tympanostomy tube  Review of Systems   Review of Systems   HENT: Positive for ear pain   Negative for ear discharge and sore throat  Respiratory: Negative for cough  Gastrointestinal: Negative for diarrhea  Musculoskeletal: Negative for neck pain  Skin: Negative for rash  Neurological: Positive for headaches  Current Medications       Current Outpatient Medications:     aspirin 81 MG tablet, Take 81 mg by mouth, Disp: , Rfl:     amoxicillin-clavulanate (AUGMENTIN) 875-125 mg per tablet, Take 1 tablet by mouth every 12 (twelve) hours for 7 days, Disp: 14 tablet, Rfl: 0    lisinopril (ZESTRIL) 5 mg tablet, Take 1 tablet (5 mg total) by mouth daily (Patient not taking: Reported on 10/24/2019), Disp: 30 tablet, Rfl: 5    metoprolol tartrate (LOPRESSOR) 100 mg tablet, Take 1 tablet (100 mg total) by mouth every 12 (twelve) hours (Patient not taking: Reported on 10/24/2019), Disp: 60 tablet, Rfl: 4    rivaroxaban (XARELTO) 20 mg tablet, Take 1 tablet (20 mg total) by mouth daily with breakfast (Patient not taking: Reported on 10/24/2019), Disp: 30 tablet, Rfl: 4    Current Allergies     Allergies as of 10/24/2019 - Reviewed 10/24/2019   Allergen Reaction Noted    No active allergies  07/05/2018              Past Medical History:   Diagnosis Date    Atrial fibrillation ([facility] [address] )     Blood clot in vein     Cervicalgia     Depression     GERD (gastroesophageal reflux disease)     Obstructive sleep apnea     uses CPAP    Occipital neuralgia     right    Shoulder dislocation     Splenic infarction        History reviewed  No pertinent surgical history  Family History   Problem Relation Age of Onset    Bleeding Disorder Mother         BLOOD DISORDER    Heart disease Paternal Grandfather         CARDIOVASCULAR DISEASE    Cerebral aneurysm Paternal Grandfather          Medications have been verified      The following portions of the patient's history were reviewed and updated as appropriate: allergies, current medications, past family history, past medical history, past social history, past surgical history and problem list     Objective   BP (!) 179/98   Pulse 90   Temp 99 2 °F (37 3 °C)   Resp 18   Ht 5' 10" (1 778 m)   Wt (!) 206 kg (454 lb)   SpO2 95%   BMI 65 14 kg/m²      Physical Exam     Physical Exam   Constitutional: He is oriented to person, place, and time  HENT:   Head: Normocephalic  Right Ear: Hearing normal  There is tenderness  No foreign bodies  Tympanic membrane is injected, erythematous and bulging  Left Ear: Hearing, tympanic membrane, external ear and ear canal normal    Nose: Mucosal edema present  Mouth/Throat: Mucous membranes are normal  Posterior oropharyngeal edema present  Tonsils are 0 on the right  Tonsils are 0 on the left  No tonsillar exudate  Neck: Trachea normal, normal range of motion and full passive range of motion without pain  Carotid bruit is not present  No thyroid mass present  Pain intermittently radiates down the right side of the neck, no fluid form the right ear, pt has not seen pcp in years, pt is morbidly obese and aware of risks of being overweight and cardiac effects  Cardiovascular: Regular rhythm, normal heart sounds and normal pulses  Pt aware of risk of having cardiac events due to poor health compliance  BP is elevated  Pulmonary/Chest: Effort normal and breath sounds normal    Neurological: He is alert and oriented to person, place, and time     verbalized all understanding of risks of having poor health compliance    Michelle GEORGE Tinoco

## 2020-03-11 ENCOUNTER — OFFICE VISIT (OUTPATIENT)
Dept: URGENT CARE | Age: 40
End: 2020-03-11
Payer: COMMERCIAL

## 2020-03-11 VITALS
RESPIRATION RATE: 18 BRPM | BODY MASS INDEX: 42.66 KG/M2 | SYSTOLIC BLOOD PRESSURE: 178 MMHG | TEMPERATURE: 98.7 F | HEIGHT: 72 IN | DIASTOLIC BLOOD PRESSURE: 99 MMHG | WEIGHT: 315 LBS | OXYGEN SATURATION: 98 % | HEART RATE: 83 BPM

## 2020-03-11 DIAGNOSIS — I10 ESSENTIAL HYPERTENSION: ICD-10-CM

## 2020-03-11 DIAGNOSIS — S39.011A STRAIN OF ABDOMINAL WALL, INITIAL ENCOUNTER: Primary | ICD-10-CM

## 2020-03-11 PROCEDURE — G0382 LEV 3 HOSP TYPE B ED VISIT: HCPCS | Performed by: PHYSICIAN ASSISTANT

## 2020-03-11 PROCEDURE — 99213 OFFICE O/P EST LOW 20 MIN: CPT | Performed by: PHYSICIAN ASSISTANT

## 2020-03-11 PROCEDURE — 99283 EMERGENCY DEPT VISIT LOW MDM: CPT | Performed by: PHYSICIAN ASSISTANT

## 2020-03-11 NOTE — PROGRESS NOTES
[facility]        NAME: Uzma Wilkinson is a 46 y o  male  : 1980    MRN: [id number]  DATE: 2020  TIME: 12:46 PM    Assessment and Plan   Strain of abdominal wall, initial encounter [S39 011A]  1  Strain of abdominal wall, initial encounter     2  Essential hypertension           Patient Instructions     Follow up with PCP in 3-5 days  Proceed to  ER if symptoms worsen  Chief Complaint     Chief Complaint   Patient presents with    Abdominal Pain     yesterday, pt states was laying in bed, rolled over onto left side and felt a pop in LUQ  states dull pain today  denies N/V/D  History of Present Illness       44-year-old male with complaints of abdominal pain  He states at rest when he is completely still he has 0 pain  He states with any deep breathing or movement his pain increases  He states yesterday morning he was laying in bed on his right side and turned over onto his back and felt a pain and popping sensation in his left upper abdomen  He states the pain has improved since yesterday  He denies any fever, chills, sweats, difficulty eating or drinking, shortness of breath or chest pain  Patient was concerned about possible hernia  Review of Systems   Review of Systems   Constitutional: Negative  HENT: Negative  Respiratory: Negative  Cardiovascular: Negative  Gastrointestinal: Positive for abdominal pain  Negative for constipation, nausea and vomiting  Genitourinary: Negative  Skin: Negative            Current Medications       Current Outpatient Medications:     aspirin 81 MG tablet, Take 81 mg by mouth, Disp: , Rfl:     lisinopril (ZESTRIL) 5 mg tablet, Take 1 tablet (5 mg total) by mouth daily (Patient not taking: Reported on 10/24/2019), Disp: 30 tablet, Rfl: 5    metoprolol tartrate (LOPRESSOR) 100 mg tablet, Take 1 tablet (100 mg total) by mouth every 12 (twelve) hours (Patient not taking: Reported on 10/24/2019), Disp: 60 tablet, Rfl: 4    rivaroxaban (XARELTO) 20 mg tablet, Take 1 tablet (20 mg total) by mouth daily with breakfast (Patient not taking: Reported on 10/24/2019), Disp: 30 tablet, Rfl: 4    Current Allergies     Allergies as of 03/11/2020 - Reviewed 03/11/2020   Allergen Reaction Noted    No active allergies  07/05/2018            The following portions of the patient's history were reviewed and updated as appropriate: allergies, current medications, past family history, past medical history, past social history, past surgical history and problem list     Objective   BP (!) 178/99 (BP Location: Left arm, Patient Position: Sitting, Cuff Size: Adult) Comment (BP Location): forearm  Pulse 83   Temp 98 7 °F (37 1 °C) (Tympanic)   Resp 18   Ht 6' (1 829 m)   Wt (!) 213 kg (469 lb)   SpO2 98%   BMI 63 61 kg/m²        Physical Exam     Physical Exam   Constitutional: He appears well-developed and well-nourished  No distress  Cardiovascular: Normal rate and regular rhythm  Pulmonary/Chest: Effort normal and breath sounds normal    Abdominal: Soft  Normal appearance and bowel sounds are normal  There is tenderness in the left upper quadrant  There is no rigidity, no rebound, no guarding, no tenderness at McBurney's point and negative Lorenzo's sign  No hernia  Nursing note and vitals reviewed

## 2020-03-11 NOTE — PATIENT INSTRUCTIONS
Abdominal wall muscle strain:  Advised patient to use heat, Tylenol in any worsening of pain, fever inability to eat and drink to the emergency room    Hypertension:  Please take blood pressure medication and follow up with your PCP as soon as possible

## 2020-06-11 ENCOUNTER — OFFICE VISIT (OUTPATIENT)
Dept: CARDIOLOGY CLINIC | Facility: CLINIC | Age: 40
End: 2020-06-11
Payer: COMMERCIAL

## 2020-06-11 VITALS
BODY MASS INDEX: 42.66 KG/M2 | SYSTOLIC BLOOD PRESSURE: 126 MMHG | WEIGHT: 315 LBS | TEMPERATURE: 99.7 F | HEART RATE: 100 BPM | HEIGHT: 72 IN | DIASTOLIC BLOOD PRESSURE: 70 MMHG

## 2020-06-11 DIAGNOSIS — I48.91 ATRIAL FIBRILLATION, UNSPECIFIED TYPE (HCC): ICD-10-CM

## 2020-06-11 DIAGNOSIS — D73.5 SPLENIC INFARCTION: ICD-10-CM

## 2020-06-11 DIAGNOSIS — I10 ESSENTIAL HYPERTENSION, BENIGN: ICD-10-CM

## 2020-06-11 DIAGNOSIS — Q21.1 PFO (PATENT FORAMEN OVALE): ICD-10-CM

## 2020-06-11 DIAGNOSIS — I10 HYPERTENSION, BENIGN: ICD-10-CM

## 2020-06-11 DIAGNOSIS — I48.0 PAROXYSMAL ATRIAL FIBRILLATION (HCC): ICD-10-CM

## 2020-06-11 DIAGNOSIS — E66.01 MORBID OBESITY WITH BODY MASS INDEX OF 40.0-49.9 (HCC): Primary | ICD-10-CM

## 2020-06-11 DIAGNOSIS — G47.33 OSA (OBSTRUCTIVE SLEEP APNEA): ICD-10-CM

## 2020-06-11 PROCEDURE — 99215 OFFICE O/P EST HI 40 MIN: CPT | Performed by: INTERNAL MEDICINE

## 2020-06-11 PROCEDURE — 93000 ELECTROCARDIOGRAM COMPLETE: CPT | Performed by: INTERNAL MEDICINE

## 2020-06-11 RX ORDER — METOPROLOL TARTRATE 50 MG/1
100 TABLET, FILM COATED ORAL EVERY 12 HOURS SCHEDULED
Qty: 60 TABLET | Refills: 6 | Status: SHIPPED | OUTPATIENT
Start: 2020-06-11

## 2020-06-11 RX ORDER — LOSARTAN POTASSIUM 25 MG/1
25 TABLET ORAL DAILY
Qty: 30 TABLET | Refills: 6 | Status: SHIPPED | OUTPATIENT
Start: 2020-06-11

## 2020-06-12 ENCOUNTER — TELEPHONE (OUTPATIENT)
Dept: CARDIOLOGY CLINIC | Facility: CLINIC | Age: 40
End: 2020-06-12

## 2020-07-21 ENCOUNTER — OFFICE VISIT (OUTPATIENT)
Dept: URGENT CARE | Age: 40
End: 2020-07-21
Payer: COMMERCIAL

## 2020-07-21 VITALS
DIASTOLIC BLOOD PRESSURE: 90 MMHG | TEMPERATURE: 98.6 F | SYSTOLIC BLOOD PRESSURE: 200 MMHG | OXYGEN SATURATION: 98 % | HEART RATE: 90 BPM | RESPIRATION RATE: 18 BRPM

## 2020-07-21 DIAGNOSIS — M79.89 LEG SWELLING: Primary | ICD-10-CM

## 2020-07-21 DIAGNOSIS — M54.42 ACUTE LEFT-SIDED LOW BACK PAIN WITH LEFT-SIDED SCIATICA: ICD-10-CM

## 2020-07-21 DIAGNOSIS — M54.32 SCIATICA OF LEFT SIDE: ICD-10-CM

## 2020-07-21 PROCEDURE — 99283 EMERGENCY DEPT VISIT LOW MDM: CPT | Performed by: PHYSICIAN ASSISTANT

## 2020-07-21 PROCEDURE — 99213 OFFICE O/P EST LOW 20 MIN: CPT | Performed by: PHYSICIAN ASSISTANT

## 2020-07-21 PROCEDURE — G0382 LEV 3 HOSP TYPE B ED VISIT: HCPCS | Performed by: PHYSICIAN ASSISTANT

## 2020-07-21 NOTE — PATIENT INSTRUCTIONS
Leg Edema   WHAT YOU NEED TO KNOW:   Leg edema is swelling caused by fluid buildup  Your legs may swell if you sit or stand for long periods of time, are pregnant, or are injured  Swelling may also occur if you have heart failure or circulation problems  This means that your heart does not pump blood through your body as it should  DISCHARGE INSTRUCTIONS:   Self-care:   · Elevate your legs:  Raise your legs above the level of your heart as often as you can  This will help decrease swelling and pain  Prop your legs on pillows or blankets to keep them elevated comfortably  · Wear pressure stockings: These tight stockings put pressure on your legs to promote blood flow and prevent blood clots  Wear the stockings during the day  Do not wear them while you sleep  · Apply heat:  Heat helps decrease pain and swelling  Apply heat on the area for 20 to 30 minutes every 2 hours for as many days as directed  · Stay active:  Do not stand or sit for long periods of time  Ask your healthcare provider about the best exercise plan for you  · Eat healthy foods:  Healthy foods include fruits, vegetables, whole-grain breads, low-fat dairy products, beans, lean meats, and fish  Ask if you need to be on a special diet  Limit salt  Salt will make your body hold even more fluid  Follow up with your healthcare provider as directed:  Write down your questions so you remember to ask them during your visits  Contact your healthcare provider if:   · You have a fever or feel more tired than usual     · The veins in your legs look larger than usual  They may look full or bulging  · Your legs itch or feel heavy  · You have red or white areas or sores on your legs  The skin may also appear dimpled or have indentations  · You are gaining weight  · You have trouble moving your ankles  · The swelling does not go away, or other parts of your body swell      · You have questions or concerns about your condition or care   Return to the emergency department if:   · You cannot walk  · You feel faint or confused  · Your skin turns blue or gray  · Your leg feels warm, tender, and painful  It may be swollen and red  · You have chest pain or trouble breathing that is worse when you lie down  · You suddenly feel lightheaded and have trouble breathing  · You have new and sudden chest pain  You may have more pain when you take deep breaths or cough  You may also cough up blood  © 2017 [employer]  Information is for End User's use only and may not be sold, redistributed or otherwise used for commercial purposes  All illustrations and images included in CareNotes® are the copyrighted property of [employer]  or [employer] Burrows  The above information is an  only  It is not intended as medical advice for individual conditions or treatments  Talk to your doctor, nurse or pharmacist before following any medical regimen to see if it is safe and effective for you

## 2020-07-21 NOTE — PROGRESS NOTES
[facility]        NAME: Lucy Montoya is a 46 y o  male  : 1980    MRN: [id number]  DATE: 2020  TIME: 5:39 PM    BP (!) 200/90   Pulse 90   Temp 98 6 °F (37 °C)   Resp 18   SpO2 98%     Assessment and Plan   Leg swelling [M79 89]  1  Leg swelling     2  Acute left-sided low back pain with left-sided sciatica     3  Sciatica of left side           Patient Instructions       Follow up with PCP in 3-5 days  Proceed to  ER if symptoms worsen  Chief Complaint     Chief Complaint   Patient presents with    Leg Pain     Pt c/o left leg pain, swelling and numbness x two weeks  Pt referred to ED by LEAH Chance  History of Present Illness       Pt with 2 week history of left leg pain and swelling worse over past few days     Leg Pain    The incident occurred more than 1 week ago  The incident occurred at home  There was no injury mechanism  The pain is present in the left leg  The pain is at a severity of 5/10  The pain is moderate  The pain has been constant since onset  He reports no foreign bodies present  Review of Systems   Review of Systems   Constitutional: Negative  HENT: Negative  Eyes: Negative  Respiratory: Negative  Cardiovascular: Negative  Gastrointestinal: Negative  Endocrine: Negative  Genitourinary: Negative  Musculoskeletal: Negative  Skin: Negative  Allergic/Immunologic: Negative  Neurological: Negative  Hematological: Negative  Psychiatric/Behavioral: Negative  All other systems reviewed and are negative          Current Medications       Current Outpatient Medications:     aspirin 81 MG tablet, Take 81 mg by mouth, Disp: , Rfl:     losartan (COZAAR) 25 mg tablet, Take 1 tablet (25 mg total) by mouth daily, Disp: 30 tablet, Rfl: 6    metoprolol tartrate (LOPRESSOR) 50 mg tablet, Take 2 tablets (100 mg total) by mouth every 12 (twelve) hours, Disp: 60 tablet, Rfl: 6    Current Allergies     Allergies as of 07/21/2020 - Reviewed 07/21/2020   Allergen Reaction Noted    No active allergies  07/05/2018            The following portions of the patient's history were reviewed and updated as appropriate: allergies, current medications, past family history, past medical history, past social history, past surgical history and problem list      Past Medical History:   Diagnosis Date    Atrial fibrillation ([address] )     Blood clot in vein     Cervicalgia     Depression     GERD (gastroesophageal reflux disease)     Obstructive sleep apnea     uses CPAP    Occipital neuralgia     right    Shoulder dislocation     Splenic infarction        History reviewed  No pertinent surgical history  Family History   Problem Relation Age of Onset    Bleeding Disorder Mother         BLOOD DISORDER    Heart disease Paternal Grandfather         CARDIOVASCULAR DISEASE    Cerebral aneurysm Paternal Grandfather          Medications have been verified  Objective   BP (!) 200/90   Pulse 90   Temp 98 6 °F (37 °C)   Resp 18   SpO2 98%        Physical Exam     Physical Exam   Constitutional: He is oriented to person, place, and time  He appears well-developed and well-nourished  Discussed with pt need for ultrasound for evaluation for dvt   HENT:   Head: Normocephalic and atraumatic  Right Ear: External ear normal    Left Ear: External ear normal    Nose: Nose normal    Mouth/Throat: Oropharynx is clear and moist    Eyes: Pupils are equal, round, and reactive to light  Conjunctivae and EOM are normal    Neck: Normal range of motion  Neck supple  Cardiovascular: Normal rate, regular rhythm and normal heart sounds  Pulmonary/Chest: Effort normal and breath sounds normal    Abdominal: Soft  Bowel sounds are normal    Musculoskeletal: Normal range of motion     Left lower leg  +3 edema  Tenderness to calf  Entire left lower leg swelling no erythema   dp pulse strong ankle and toes from   Sensation wnl     Left lower back pain left sciatic notch pain    Neurological: He is alert and oriented to person, place, and time  Skin: Skin is warm  Nursing note and vitals reviewed

## 2021-03-21 ENCOUNTER — OFFICE VISIT (OUTPATIENT)
Dept: URGENT CARE | Age: 41
End: 2021-03-21
Payer: COMMERCIAL

## 2021-03-21 VITALS
TEMPERATURE: 97.5 F | WEIGHT: 315 LBS | HEIGHT: 72 IN | RESPIRATION RATE: 18 BRPM | HEART RATE: 69 BPM | OXYGEN SATURATION: 97 % | BODY MASS INDEX: 42.66 KG/M2

## 2021-03-21 DIAGNOSIS — R60.0 SWELLING OF SALIVARY GLAND: Primary | ICD-10-CM

## 2021-03-21 PROCEDURE — 99283 EMERGENCY DEPT VISIT LOW MDM: CPT | Performed by: PHYSICIAN ASSISTANT

## 2021-03-21 PROCEDURE — G0382 LEV 3 HOSP TYPE B ED VISIT: HCPCS | Performed by: PHYSICIAN ASSISTANT

## 2021-03-21 PROCEDURE — 99213 OFFICE O/P EST LOW 20 MIN: CPT | Performed by: PHYSICIAN ASSISTANT

## 2021-03-21 NOTE — PATIENT INSTRUCTIONS
Apply warm compress to affected area 3-5 times daily for 15 minutes  Use sialogogues, anything that will increase saliva production usually lemon drops or other hard sour candy  2-3 times daily  If symptoms fail to improve in 2-3 days follow-up with primary care provider  If symptoms worsen or you become ill report to the emergency room  Sialoadenitis   AMBULATORY CARE:   Sialoadenitis  is an inflammation or infection of one or more of your salivary glands  A small stone can block the salivary gland and cause inflammation  Infection may be caused by a virus or bacteria  You can develop sialoadenitis on one or both sides of your face  Common symptoms include the following:   · Pain and swelling of a salivary gland, especially during or right after eating    · Bad breath or tooth pain    · Pus in your mouth    · Fever    Seek care immediately if:   · You have trouble breathing or swallowing because of swelling  Call your doctor if:   · You have trouble opening your mouth because of swelling  · Your salivary gland gets more red and hot or drains more pus  · The pain and swelling do not go away within 2 days, or they get worse  · Your mouth is very dry  · You lose movement on one side of your face  · You have questions about your condition or care  Treatment  may include any of the following:  · Medicines:      ? Antibiotics  may be given to treat a bacterial infection  ? Acetaminophen  decreases pain and fever  It is available without a doctor's order  Ask how much to give your child and how often to give it  Follow directions  Read the labels of all other medicines your child uses to see if they also contain acetaminophen, or ask your child's doctor or pharmacist  Acetaminophen can cause liver damage if not taken correctly  ? NSAIDs , such as ibuprofen, help decrease swelling, pain, and fever  This medicine is available with or without a doctor's order   NSAIDs can cause stomach bleeding or kidney problems in certain people  If you take blood thinner medicine, always ask your healthcare provider if NSAIDs are safe for you  Always read the medicine label and follow directions  ? Take your medicine as directed  Contact your healthcare provider if you think your medicine is not helping or if you have side effects  Tell him or her if you are allergic to any medicine  Keep a list of the medicines, vitamins, and herbs you take  Include the amounts, and when and why you take them  Bring the list or the pill bottles to follow-up visits  Carry your medicine list with you in case of an emergency  · Procedures:      ? Removal of one or more stones  may be needed if other treatments do not work  ? An incision and drainage  may be needed if there is an abscess (pocket of pus) that does not respond to other treatments  Manage or prevent sialoadenitis:   · Drink liquids as directed  You may need to drink more liquids than usual  Ask how much liquid to drink each day and which liquids are best for you  Good choices of liquids for most people include water, tea, soup, juice, or milk  · Practice good oral care  Brush your teeth 2 times a day, 1 time in the morning and 1 time in the evening  Use a fluoride toothpaste  Floss your teeth 1 time each day, usually in the evening  Use mouthwash after you floss  Swish it around in your mouth for 30 seconds and spit it out  · Keep your mouth moist   Suck on hard candy or chew sugarless gum to get your saliva flowing  Sour and tart flavors such as lemon and orange will help get saliva to flow  This will help keep your mouth moist and help push out a stone blocking your salivary duct  · Apply a warm, wet cloth and massage  the swollen area as directed  This may help relieve swelling and pain by pushing the pus out of the gland      Follow up with your doctor or dentist as directed:  Write down your questions so you remember to ask them during your visits  © Copyright [address] Information is for End User's use only and may not be sold, redistributed or otherwise used for commercial purposes  All illustrations and images included in CareNotes® are the copyrighted property of [employer]  or Jillian [employer]nes  The above information is an  only  It is not intended as medical advice for individual conditions or treatments  Talk to your doctor, nurse or pharmacist before following any medical regimen to see if it is safe and effective for you  Warm Compress or Soak   AMBULATORY CARE:   What you need to know about a warm compress or soak:  A warm compress or soak helps improve blood flow to tissues and relieve pain and swelling  This will help you heal from an injury or illness  You may need a warm compress or soak to help manage any of the following:  · A sinus infection or upper respiratory infection    · A blocked tear duct, eye infection, or a stye    · A skin abscess or infection    · An ingrown toenail    · An ear infection    · A soft or deep tissue injury    · A muscle or joint injury, such as a sprain    Contact your healthcare provider if:   · Your symptoms do not improve or you have new symptoms  · You see blisters on the area where you applied the compress or soak  · You have questions or concerns about your condition or care  How to prepare and use a moist warm compress: Your healthcare provider will tell you how often to apply a warm compress:  · Wash your hands  · Use a washcloth, small towel, or gauze as your compress  · You can place the compress under running water or place it in a bowl with warm water  Check the temperature of the water with a thermometer  The water should not be warmer than 100°F for babies, 105°F for children, and 120°F for adults  Adults should use water that is 105°F if they will apply the compress to an eye        · If directed, add 1 tablespoon of salt to the water  Squeeze extra water out of the compress  · Place the compress directly on the area  If directed, gently massage the area with the compress  Check your skin in 2 minutes for blisters or bright red skin  Your skin should look pink to light red  · You may need to rewarm the compress every 5 minutes  · Remove the compress in 15 to 30 minutes, or when the compress starts to feel cold  Gently pat your skin dry with a clean towel  · Wash your hands  · Reapply the compress as many times as directed each day  Use a clean compress every time  How to use a dry warm compress:  A dry compress may be a hot water bottle or a heating pad  You can also buy a prepared hot pack  Follow the package directions for how to use these devices  Cover a bottle or hot pack with a towel before you apply it to your skin  Do not leave a dry compress on your skin for more than 20 minutes or as directed  Do not fall asleep with a dry compress on your skin  A dry compress may burn your skin if it is left on for too long  How to prepare and use a warm soak:   · Fill a clean container or tub with warm water and soap  The container should be deep enough to cover the area completely  · Check the temperature of the water with a thermometer  The water should not be warmer than 100°F for children and babies, and 110°F for adults  · If directed, add 1 tablespoon of salt to the water  · Remove any bandages  · Soak the area for 30 minutes or as long as directed  Gently pat your skin dry when you are done soaking  · Replace bandages as directed  · Clean the container or tub when finished  · Wash your hands  Follow up with your healthcare provider as directed:  Write down your questions so you remember to ask them during your visits  © Copyright [address] Information is for End User's use only and may not be sold, redistributed or otherwise used for commercial purposes   All illustrations and images included in CareNotes® are the copyrighted property of [employer]  or Jillian Byrnes  The above information is an  only  It is not intended as medical advice for individual conditions or treatments  Talk to your doctor, nurse or pharmacist before following any medical regimen to see if it is safe and effective for you

## 2021-03-21 NOTE — PROGRESS NOTES
[facility]        NAME: Taina Peralta is a 45 y o  male  : 1980    MRN: [id number]  DATE: 2021  TIME: 10:39 AM    Assessment and Plan   Swelling of salivary gland [R60 0]  1  Swelling of salivary gland     Discussed with patient that salivary gland appears swollen  I do not appreciate any duct blocks  Discussed that this can be caused by infection or salivary stone  Apply warm compress to affected area 3-5 times daily for 15 minutes  Use sialogogues, anything that will increase saliva production usually lemon drops or other hard sour candy  2-3 times daily  If symptoms fail to improve in 2-3 days follow-up with primary care provider  If symptoms worsen or you become ill report to the emergency room  Carefully monitor left infraoribtal symptoms, if any changes report back to clinic or go to the emergency room  If not improving  In 1-3 days, see PCP regarding  Patient Instructions       Follow up with PCP in 3-5 days  Proceed to  ER if symptoms worsen  Chief Complaint     Chief Complaint   Patient presents with    Swollen Glands     pt states he has swollen glands on left side of neck/jawline  Pt denies sore throat, denies dental issues  Pt states it is giving him anxiety         History of Present Illness       45year old male presents with complaints of swelling of the left side of the neck  Pt reports this has been present for 2-3 days now  He reports significant pain and tenderness last night and took several ibuprofen to get pain under control  He has not tried any additional treatment at this time  Pt is concerned it may be an infection and this is causing an increase in his anxiety level  He denies fever, chills  He has not noticed any decreased saliva production and states he broke a upper molar last week and hasn't been eating a lot so is unsure if pain is associated with eating at this time  Denies recent upper respiratory illness   Pt additionally notes some infraorbital swelling and tingling on the left side of the face  No other concerns or complaints  Pt has significant cardiac history with A  Fib, PFO, and cardiomyopathy  Review of Systems   Review of Systems   Constitutional: Negative for chills, fatigue and fever  HENT: Positive for facial swelling  Musculoskeletal: Negative for myalgias  Neurological: Negative for dizziness and light-headedness  Psychiatric/Behavioral: The patient is nervous/anxious  Current Medications       Current Outpatient Medications:     aspirin 81 MG tablet, Take 81 mg by mouth, Disp: , Rfl:     losartan (COZAAR) 25 mg tablet, Take 1 tablet (25 mg total) by mouth daily, Disp: 30 tablet, Rfl: 6    metoprolol tartrate (LOPRESSOR) 50 mg tablet, Take 2 tablets (100 mg total) by mouth every 12 (twelve) hours (Patient not taking: Reported on 3/21/2021), Disp: 60 tablet, Rfl: 6    Current Allergies     Allergies as of 03/21/2021 - Reviewed 03/21/2021   Allergen Reaction Noted    No active allergies  07/05/2018            The following portions of the patient's history were reviewed and updated as appropriate: allergies, current medications, past family history, past medical history, past social history, past surgical history and problem list      Past Medical History:   Diagnosis Date    Atrial fibrillation ([address] )     Blood clot in vein     Cervicalgia     Depression     GERD (gastroesophageal reflux disease)     Obstructive sleep apnea     uses CPAP    Occipital neuralgia     right    Shoulder dislocation     Splenic infarction        History reviewed  No pertinent surgical history  Family History   Problem Relation Age of Onset    Bleeding Disorder Mother         BLOOD DISORDER    Heart disease Paternal Grandfather         CARDIOVASCULAR DISEASE    Cerebral aneurysm Paternal Grandfather          Medications have been verified          Objective   Pulse 69   Temp 97 5 °F (36 4 °C)   Resp 18   Ht 6' (1 829 m)   Wt (!) 217 kg (478 lb)   SpO2 97%   BMI 64 83 kg/m²   No LMP for male patient  Physical Exam     Physical Exam  Vitals signs and nursing note reviewed  Constitutional:       General: He is awake  He is not in acute distress  Appearance: Normal appearance  He is well-developed and well-groomed  He is morbidly obese  He is not ill-appearing, toxic-appearing or diaphoretic  HENT:      Head: Normocephalic and atraumatic  Right Ear: Hearing, tympanic membrane, ear canal and external ear normal       Left Ear: Hearing, tympanic membrane, ear canal and external ear normal       Nose: No nasal deformity, septal deviation, signs of injury, laceration, nasal tenderness, mucosal edema, congestion or rhinorrhea  Mouth/Throat:      Lips: Pink  Lesions (cold sore to left lower lip) present  Mouth: Mucous membranes are moist  No injury, lacerations, oral lesions or angioedema  Dentition: Abnormal dentition (poor dentition with multiple fillings and borken tetth)  Gum lesions (white plaque posterior to the left lower molars, no friability, plaque does not scrape away) present  No gingival swelling or dental abscesses  Tongue: No lesions  Tongue does not deviate from midline  Palate: No mass and lesions  Pharynx: Oropharynx is clear  Uvula midline  No pharyngeal swelling, oropharyngeal exudate, posterior oropharyngeal erythema or uvula swelling  Eyes:      General: Lids are normal  Vision grossly intact  Gaze aligned appropriately  Allergic shiner (left lower eyelid) present  No scleral icterus  Extraocular Movements: Extraocular movements intact  Conjunctiva/sclera: Conjunctivae normal    Neck:      Vascular: Normal carotid pulses  No carotid bruit  Trachea: Trachea and phonation normal       Comments: Swelling and tenderness of the left submandibular gland  Lymphadenopathy:      Cervical: No cervical adenopathy     Skin:     General: Skin is warm and dry  Neurological:      Mental Status: He is alert and oriented to person, place, and time  Coordination: Coordination is intact  Gait: Gait is intact  Psychiatric:         Attention and Perception: Attention and perception normal          Mood and Affect: Mood is anxious  Speech: Speech normal          Behavior: Behavior normal  Behavior is cooperative

## 2021-08-30 ENCOUNTER — OFFICE VISIT (OUTPATIENT)
Dept: URGENT CARE | Age: 41
End: 2021-08-30
Payer: COMMERCIAL

## 2021-08-30 VITALS
TEMPERATURE: 97.5 F | HEIGHT: 72 IN | BODY MASS INDEX: 42.66 KG/M2 | SYSTOLIC BLOOD PRESSURE: 178 MMHG | OXYGEN SATURATION: 96 % | RESPIRATION RATE: 20 BRPM | DIASTOLIC BLOOD PRESSURE: 101 MMHG | WEIGHT: 315 LBS | HEART RATE: 78 BPM

## 2021-08-30 DIAGNOSIS — S40.862A INSECT BITE OF LEFT UPPER ARM, INITIAL ENCOUNTER: Primary | ICD-10-CM

## 2021-08-30 DIAGNOSIS — L03.114 CELLULITIS OF LEFT UPPER EXTREMITY: ICD-10-CM
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
## 2021-08-30 PROCEDURE — 99213 OFFICE O/P EST LOW 20 MIN: CPT | Performed by: PHYSICIAN ASSISTANT

## 2021-08-30 RX ORDER — SULFAMETHOXAZOLE AND TRIMETHOPRIM 800; 160 MG/1; MG/1
1 TABLET ORAL EVERY 12 HOURS SCHEDULED
Qty: 14 TABLET | Refills: 0 | Status: SHIPPED | OUTPATIENT
Start: 2021-08-30 | End: 2021-09-06

## 2021-08-30 NOTE — PATIENT INSTRUCTIONS
Antibiotics as directed  Benadryl 2 tablets every 6 hours as needed for itching and swelling  Ice 20 minutes on 20 minutes off  Follow-up with PCP if symptoms are not improved in 2-3 days  If symptoms worsen reports emergency room  Follow-up with primary care doctor a regarding blood pressure  Cellulitis   AMBULATORY CARE:   Cellulitis  is a skin infection caused by bacteria  Cellulitis is common and can become severe  Cellulitis usually appears on the lower legs  It can also appear on the arms, face, and other areas  Cellulitis develops when bacteria enter a crack or break in your skin, such as a scratch, bite, or cut  Common signs and symptoms:  Signs and symptoms usually appear on one side of your body  You may have any of the following:  · A fever    · A red, warm, swollen area on your skin    · Pain when the area is touched    · Red spots, bumps, or blisters that may drain pus    · Bumpy, raised skin that feels like an orange peel    Seek care immediately if:   · Your wound gets larger and more painful  · You feel a crackling under your skin when you touch it  · You have purple dots or bumps on your skin, or you see bleeding under your skin  · You see red streaks coming from the infected area  Call your doctor if:   · The red, warm, swollen area gets larger  · Your fever or pain does not go away or gets worse  · The area does not get smaller after 3 days of antibiotics  · You have questions or concerns about your condition or care  Treatment:  You should start to see improvement in 3 days  If your cellulitis is severe, you may need IV antibiotics in the hospital  If cellulitis is not treated, the infection can spread through your body and become life-threatening  You may need any of the following medicines:  · Antibiotics  help treat the bacterial infection  · Acetaminophen  decreases pain and fever  It is available without a doctor's order   Ask how much to take and how areas     · Do not share personal items, such as towels, clothing, and razors  · Clean exercise equipment  with germ-killing  before and after you use it  · Treat athlete's foot  This can help prevent the spread of a bacterial skin infection  · Wash your hands often  Use soap and water  Wash your hands after you use the bathroom, change a child's diapers, or sneeze  Wash your hands before you prepare or eat food  Use lotion to prevent dry, cracked skin  Follow up with your doctor within 3 days, or as directed:  He or she will check if your cellulitis is getting better  Write down your questions so you remember to ask them during your visits  © Copyright [employer] 2021 Information is for End User's use only and may not be sold, redistributed or otherwise used for commercial purposes  All illustrations and images included in CareNotes® are the copyrighted property of [employer]  or Jillian Byrnes  The above information is an  only  It is not intended as medical advice for individual conditions or treatments  Talk to your doctor, nurse or pharmacist before following any medical regimen to see if it is safe and effective for you
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
## 2022-01-31 ENCOUNTER — OFFICE VISIT (OUTPATIENT)
Dept: URGENT CARE | Age: 42
End: 2022-01-31
Payer: COMMERCIAL

## 2022-01-31 VITALS
SYSTOLIC BLOOD PRESSURE: 189 MMHG | HEART RATE: 71 BPM | BODY MASS INDEX: 42.66 KG/M2 | OXYGEN SATURATION: 100 % | TEMPERATURE: 98 F | WEIGHT: 315 LBS | DIASTOLIC BLOOD PRESSURE: 90 MMHG | HEIGHT: 72 IN | RESPIRATION RATE: 20 BRPM

## 2022-01-31 DIAGNOSIS — R07.89 CHEST WALL DISCOMFORT: Primary | ICD-10-CM

## 2022-01-31 LAB
ATRIAL RATE: 74 BPM
P AXIS: 58 DEGREES
PR INTERVAL: 128 MS
QRS AXIS: 35 DEGREES
QRSD INTERVAL: 94 MS
QT INTERVAL: 398 MS
QTC INTERVAL: 441 MS
T WAVE AXIS: 43 DEGREES
VENTRICULAR RATE: 74 BPM

## 2022-01-31 PROCEDURE — 93010 ELECTROCARDIOGRAM REPORT: CPT | Performed by: NURSE PRACTITIONER

## 2022-01-31 PROCEDURE — 93005 ELECTROCARDIOGRAM TRACING: CPT | Performed by: NURSE PRACTITIONER

## 2022-01-31 PROCEDURE — 99213 OFFICE O/P EST LOW 20 MIN: CPT | Performed by: NURSE PRACTITIONER

## 2022-01-31 NOTE — PATIENT INSTRUCTIONS
[facility]    Chest Pain   WHAT YOU NEED TO KNOW:   Chest pain can be caused by a range of conditions, from not serious to life-threatening  Chest pain can be a symptom of a digestive problem, such as acid reflux or a stomach ulcer  An anxiety attack or a strong emotion, such as anger, can also cause chest pain  Infection, inflammation, or a fracture in the bones or cartilage in your chest can cause pain or discomfort  Sometimes chest pain or pressure is caused by poor blood flow to your heart (angina)  Chest pain may also be caused by life-threatening conditions such as a heart attack or blood clot in your lungs  DISCHARGE INSTRUCTIONS:   Call your local emergency number (911 in the [address] [facility]) or have someone call if:   · You have any of the following signs of a heart attack:      ? Squeezing, pressure, or pain in your chest    ? You may  also have any of the following:     § Discomfort or pain in your back, neck, jaw, stomach, or arm    § Shortness of breath    § Nausea or vomiting    § Lightheadedness or a sudden cold sweat      Return to the emergency department if:   · You have chest discomfort that gets worse, even with medicine  · You cough or vomit blood  · Your bowel movements are black or bloody  · You cannot stop vomiting, or it hurts to swallow  Call your doctor if:   · You have questions or concerns about your condition or care  Medicines:   · Medicines  may be given to treat the cause of your chest pain  Examples include pain medicine, anxiety medicine, or medicines to increase blood flow to your heart  · Do not take certain medicines without asking your healthcare provider first   These include NSAIDs, herbal or vitamin supplements, or hormones (estrogen or progestin)  · Take your medicine as directed  Contact your healthcare provider if you think your medicine is not helping or if you have side effects   Tell him or her if you are allergic to any medicine  Keep a list of the medicines, vitamins, and herbs you take  Include the amounts, and when and why you take them  Bring the list or the pill bottles to follow-up visits  Carry your medicine list with you in case of an emergency  Healthy living tips: The following are general healthy guidelines  If the cause of your chest pain is known, your healthcare provider will give you specific guidelines to follow  · Do not smoke  Nicotine and other chemicals in cigarettes and cigars can cause lung and heart damage  Ask your healthcare provider for information if you currently smoke and need help to quit  E-cigarettes or smokeless tobacco still contain nicotine  Talk to your healthcare provider before you use these products  · Choose a variety of healthy foods as often as possible  Include fresh, frozen, or canned fruits and vegetables  Also include low-fat dairy products, fish, chicken (without skin), and lean meats  Your healthcare provider or a dietitian can help you create meal plans  You may need to avoid certain foods or drinks if your pain is caused by a digestion problem  · Lower your sodium (salt) intake  Limit foods that are high in sodium, such as canned foods, salty snacks, and cold cuts  If you add salt when you cook food, do not add more at the table  Choose low-sodium canned foods as much as possible  · Drink plenty of water every day  Water helps your body to control your temperature and blood pressure  Ask your healthcare provider how much water you should drink every day  · Ask about activity  Your healthcare provider will tell you which activities to limit or avoid  Ask when you can drive, return to work, and have sex  Ask about the best exercise plan for you  · Maintain a healthy weight  Ask your healthcare provider what a healthy weight is for you  Ask him or her to help you create a safe weight loss plan if you are overweight      · Ask about vaccines you may need  Get the influenza (flu) vaccine every year as soon as recommended, usually in September or October  You may also need a pneumococcal vaccine to prevent pneumonia  The vaccine is usually given every 5 years, starting at age 72  Your healthcare provider can tell you if should get other vaccines, and when to get them  Follow up with your healthcare provider within 72 hours, or as directed: You may need to return for more tests to find the cause of your chest pain  You may be referred to a specialist, such as a cardiologist or gastroenterologist  Write down your questions so you remember to ask them during your visits  © Copyright [employer] 2021 Information is for End User's use only and may not be sold, redistributed or otherwise used for commercial purposes  All illustrations and images included in CareNotes® are the copyrighted property of [employer]  or Jillian Byrnes  The above information is an  only  It is not intended as medical advice for individual conditions or treatments  Talk to your doctor, nurse or pharmacist before following any medical regimen to see if it is safe and effective for you

## 2022-01-31 NOTE — PROGRESS NOTES
NAME: Bertrand Etienne is a 37 y o  male  : 1980    MRN: [id number]    BP (!) 189/90   Pulse 71   Temp 98 °F (36 7 °C)   Resp 20   Ht 6' (1 829 m)   Wt (!) 218 kg (480 lb)   SpO2 100%   BMI 65 10 kg/m²     Assessment and Plan   Chest wall discomfort [R07 89]  1  Chest wall discomfort  ECG 12 lead    Transfer to other facility       Nikia Ovalle was seen today for atrial fibrillation  Diagnoses and all orders for this visit:    Chest wall discomfort  -     ECG 12 lead  -     Transfer to other facility    Other orders  -     ECG 12 lead  -     ECG 12 lead  -     EKG RESULTS  -     ECG 12 lead        Patient Instructions     Patient Instructions     Pt going to the ER for further evaluation- [facility]    Chest Pain   WHAT YOU NEED TO KNOW:   Chest pain can be caused by a range of conditions, from not serious to life-threatening  Chest pain can be a symptom of a digestive problem, such as acid reflux or a stomach ulcer  An anxiety attack or a strong emotion, such as anger, can also cause chest pain  Infection, inflammation, or a fracture in the bones or cartilage in your chest can cause pain or discomfort  Sometimes chest pain or pressure is caused by poor blood flow to your heart (angina)  Chest pain may also be caused by life-threatening conditions such as a heart attack or blood clot in your lungs  DISCHARGE INSTRUCTIONS:   Call your local emergency number (911 in the [address] [place] [address],[address]) or have someone call if:   · You have any of the following signs of a heart attack:      ? Squeezing, pressure, or pain in your chest    ? You may  also have any of the following:     § Discomfort or pain in your back, neck, jaw, stomach, or arm    § Shortness of breath    § Nausea or vomiting    § Lightheadedness or a sudden cold sweat      Return to the emergency department if:   · You have chest discomfort that gets worse, even with medicine  · You cough or vomit blood  · Your bowel movements are black or bloody      · You cannot stop vomiting, or it hurts to swallow  Call your doctor if:   · You have questions or concerns about your condition or care  Medicines:   · Medicines  may be given to treat the cause of your chest pain  Examples include pain medicine, anxiety medicine, or medicines to increase blood flow to your heart  · Do not take certain medicines without asking your healthcare provider first   These include NSAIDs, herbal or vitamin supplements, or hormones (estrogen or progestin)  · Take your medicine as directed  Contact your healthcare provider if you think your medicine is not helping or if you have side effects  Tell him or her if you are allergic to any medicine  Keep a list of the medicines, vitamins, and herbs you take  Include the amounts, and when and why you take them  Bring the list or the pill bottles to follow-up visits  Carry your medicine list with you in case of an emergency  Healthy living tips: The following are general healthy guidelines  If the cause of your chest pain is known, your healthcare provider will give you specific guidelines to follow  · Do not smoke  Nicotine and other chemicals in cigarettes and cigars can cause lung and heart damage  Ask your healthcare provider for information if you currently smoke and need help to quit  E-cigarettes or smokeless tobacco still contain nicotine  Talk to your healthcare provider before you use these products  · Choose a variety of healthy foods as often as possible  Include fresh, frozen, or canned fruits and vegetables  Also include low-fat dairy products, fish, chicken (without skin), and lean meats  Your healthcare provider or a dietitian can help you create meal plans  You may need to avoid certain foods or drinks if your pain is caused by a digestion problem  · Lower your sodium (salt) intake  Limit foods that are high in sodium, such as canned foods, salty snacks, and cold cuts   If you add salt when you cook food, do not add more at the table  Choose low-sodium canned foods as much as possible  · Drink plenty of water every day  Water helps your body to control your temperature and blood pressure  Ask your healthcare provider how much water you should drink every day  · Ask about activity  Your healthcare provider will tell you which activities to limit or avoid  Ask when you can drive, return to work, and have sex  Ask about the best exercise plan for you  · Maintain a healthy weight  Ask your healthcare provider what a healthy weight is for you  Ask him or her to help you create a safe weight loss plan if you are overweight  · Ask about vaccines you may need  Get the influenza (flu) vaccine every year as soon as recommended, usually in September or October  You may also need a pneumococcal vaccine to prevent pneumonia  The vaccine is usually given every 5 years, starting at age 72  Your healthcare provider can tell you if should get other vaccines, and when to get them  Follow up with your healthcare provider within 72 hours, or as directed: You may need to return for more tests to find the cause of your chest pain  You may be referred to a specialist, such as a cardiologist or gastroenterologist  Write down your questions so you remember to ask them during your visits  © Copyright [employer] 2021 Information is for End User's use only and may not be sold, redistributed or otherwise used for commercial purposes  All illustrations and images included in CareNotes® are the copyrighted property of [employer]  or Jillian Dyson   The above information is an  only  It is not intended as medical advice for individual conditions or treatments  Talk to your doctor, nurse or pharmacist before following any medical regimen to see if it is safe and effective for you          Proceed to the nearest ER if symptoms worsen, Follow up with your PCP  Continue to social distance, wash your hands, and wear your masks  Please continue to follow the CDC  gov guidelines daily for they are subject to change on COVID-19    Chief Complaint     Chief Complaint   Patient presents with    Atrial Fibrillation     pt states he has a history of afib  late last night he felt like a flutter in his heart  went to sleep and woke up this morning and came here to get checked out  pt states the last episode as about 2 years ago  History of Present Illness     37year old male here today with left-sided chest discomfort towards lateral aspect near ribs  Patient stated came on suddenly while watching the football game yesterday when he was sitting down  Denies lifting pulling pushing anything heavy in the past 3-4 days and denies shoveling the past few days  Patient does have a history of atrial fibrillation where he was taking losartan and metoprolol but was taken off of them by his doctor per patient approximately 3 years ago  He was diagnosed with COVID November 2020 denies having any complications since  He has not followed up with a family doctor in the past 3 years  Patient denies having any shortness of breath or chest tightness but states that he felt his heart going to any regular rhythm last night and felt like he was in atrial fibrillation  Patient comes in today with no signs of AFib denies feeling the abnormality in his chest today and woke up twice at 5:00 a m  and at 7:00 a m  this morning without symptoms  Because it was on his mind patient came in to be seen  Denies any direct injury or trauma to the left side of chest     Atrial Fibrillation  Symptoms are negative for shortness of breath  Past medical history includes atrial fibrillation  Review of Systems   Review of Systems   Constitutional: Negative for chills, diaphoresis, fatigue and fever  HENT: Negative  Eyes: Negative  Respiratory: Positive for chest tightness (discomfort, left side)   Negative for cough, shortness of breath, wheezing and stridor  Cardiovascular: Negative  Gastrointestinal: Negative  Obese     Endocrine: Negative  Genitourinary: Negative  Musculoskeletal: Negative  Skin: Negative  Neurological: Negative  Psychiatric/Behavioral: Negative  Current Medications       Current Outpatient Medications:     aspirin 81 MG tablet, Take 81 mg by mouth (Patient not taking: Reported on 1/31/2022 ), Disp: , Rfl:     losartan (COZAAR) 25 mg tablet, Take 1 tablet (25 mg total) by mouth daily (Patient not taking: Reported on 8/30/2021), Disp: 30 tablet, Rfl: 6    metoprolol tartrate (LOPRESSOR) 50 mg tablet, Take 2 tablets (100 mg total) by mouth every 12 (twelve) hours (Patient not taking: Reported on 3/21/2021), Disp: 60 tablet, Rfl: 6    Current Allergies     Allergies as of 01/31/2022 - Reviewed 01/31/2022   Allergen Reaction Noted    No active allergies  07/05/2018              Past Medical History:   Diagnosis Date    Atrial fibrillation ([address] )     Blood clot in vein     Cervicalgia     Depression     GERD (gastroesophageal reflux disease)     Obstructive sleep apnea     uses CPAP    Occipital neuralgia     right    Shoulder dislocation     Splenic infarction        No past surgical history on file  Family History   Problem Relation Age of Onset    Bleeding Disorder Mother         BLOOD DISORDER    Heart disease Paternal Grandfather         CARDIOVASCULAR DISEASE    Cerebral aneurysm Paternal Grandfather          Medications have been verified      The following portions of the patient's history were reviewed and updated as appropriate: allergies, current medications, past family history, past medical history, past social history, past surgical history and problem list     Objective   BP (!) 189/90   Pulse 71   Temp 98 °F (36 7 °C)   Resp 20   Ht 6' (1 829 m)   Wt (!) 218 kg (480 lb)   SpO2 100%   BMI 65 10 kg/m²      Physical Exam     Physical Exam  Constitutional:       Appearance: Normal appearance  He is obese  Neck:      Comments: lg circumference   Cardiovascular:      Rate and Rhythm: Normal rate and regular rhythm  Pulmonary:      Effort: Pulmonary effort is normal       Breath sounds: Normal breath sounds and air entry  No decreased breath sounds, wheezing, rhonchi or rales  Chest:      Comments: No pain on palpation to the left side of chest/torso, unable to reproduce    Abdominal:      Comments: obese   Skin:     General: Skin is warm  Neurological:      Mental Status: He is alert  Psychiatric:         Attention and Perception: Attention normal          Mood and Affect: Mood normal          Speech: Speech normal      no bruising to the chest,           Note: Portions of this record may have been created with voice recognition software  Occasional wrong word or "sound a like" substitutions may have occurred due to the inherent limitations of voice recognition software  Please read the chart carefully and recognize, using context, where substitutions have occurred  GEORGE Cervantes

## 2022-02-22 ENCOUNTER — OFFICE VISIT (OUTPATIENT)
Dept: CARDIOLOGY CLINIC | Facility: CLINIC | Age: 42
End: 2022-02-22
Payer: COMMERCIAL

## 2022-02-22 VITALS
DIASTOLIC BLOOD PRESSURE: 70 MMHG | HEIGHT: 72 IN | HEART RATE: 90 BPM | SYSTOLIC BLOOD PRESSURE: 146 MMHG | BODY MASS INDEX: 42.66 KG/M2 | WEIGHT: 315 LBS

## 2022-02-22 DIAGNOSIS — E66.01 MORBID OBESITY WITH BODY MASS INDEX OF 40.0-49.9 (HCC): ICD-10-CM

## 2022-02-22 DIAGNOSIS — I51.7 MODERATE CONCENTRIC LEFT VENTRICULAR HYPERTROPHY: ICD-10-CM

## 2022-02-22 DIAGNOSIS — I11.9 HYPERTENSIVE HEART DISEASE WITHOUT HEART FAILURE: ICD-10-CM

## 2022-02-22 DIAGNOSIS — I48.0 PAROXYSMAL ATRIAL FIBRILLATION (HCC): Primary | ICD-10-CM

## 2022-02-22 DIAGNOSIS — Z91.14 NONCOMPLIANCE WITH CPAP TREATMENT: ICD-10-CM

## 2022-02-22 DIAGNOSIS — G47.33 OSA (OBSTRUCTIVE SLEEP APNEA): ICD-10-CM

## 2022-02-22 PROBLEM — Z91.199 NONCOMPLIANCE WITH CPAP TREATMENT: Status: ACTIVE | Noted: 2022-02-22

## 2022-02-22 PROCEDURE — 93000 ELECTROCARDIOGRAM COMPLETE: CPT | Performed by: INTERNAL MEDICINE

## 2022-02-22 PROCEDURE — 99215 OFFICE O/P EST HI 40 MIN: CPT | Performed by: INTERNAL MEDICINE

## 2022-02-22 RX ORDER — AMOXICILLIN 500 MG/1
TABLET, FILM COATED ORAL
COMMUNITY
Start: 2022-02-02

## 2022-02-22 RX ORDER — IBUPROFEN 600 MG/1
600 TABLET ORAL
COMMUNITY
Start: 2022-02-02

## 2022-02-22 RX ORDER — CHLORHEXIDINE GLUCONATE 0.12 MG/ML
RINSE ORAL
COMMUNITY
Start: 2022-02-02

## 2022-02-22 NOTE — ASSESSMENT & PLAN NOTE
Mr Jeannie Hung seems to be overall stable from cardiac perspective  He did have episode chest pain but description was atypical for angina  Does not look like he is having symptomatic AFib  He continues to be morbidly obese  He has not been compliant with use of CPAP  His previous workup in 2020 had showed moderate concentric left ventricular hypertrophy, normal left ventricular systolic function and no pulmonary hypertension  He was previously on antihypertensive medications but is not been taking them  Blood pressure today is slightly on the higher side  -- at this time I am not initiating any medical therapy  -- I do want him to have some basic blood work and an echocardiogram to reassess cardiac structure and function  --I would like to see him back more time in 3 months and review findings of echocardiogram and consider initiating antihypertensive therapy  -- I am strongly encouraging him to use the CPAP however he will need new evaluation and prescriptions so I am referring him to [facility] specialist   -- he expresses interest in weight loss program at [facility]  A referral is being made for the same  -- encouraging him to stay active, eat healthy and try to lose weight  -- I am encouraging him to reestablish follow-up with a primary care physician  HEALTHY LIFESTYLE RECOMMENDATIONS         Lifestyle recommendations  SMOKING CESSATION-- Use pharmacological and behavioural strategies to help patients quit smoking  Avoid passive smoking  HEALTHY DIET-- Diet high in vegetables, fruit, and wholegrains  Limit saturated fat to <10% of total intake  Limit alcohol to <100 g/week or 15 g/day  PHYSICAL ACTIVITY-- 30 - 60 min moderate physical activity most days, but even irregular activity is beneficial   HEALTHY WEIGHT-- Obtain and maintain a healthy weight (<25 kg/m2), or reduce weight through recommended energy intake and increased  physical activity    OTHER-- Take medications as prescribed  Healthy diet characteristics  Choose a dietary pattern that emphasizes intake of vegetables, fruit, whole grains, low-fat dairy products, poultry, fish, legumes, nontropical vegetable oils, and nuts and limits the intake of sugar-sweetened beverages and red meats:     1  Increase consumption of fruits and vegetables (>_200 g each per day)  2  35-45 g of fibre per day, preferably from wholegrains  3  Moderate consumption of nuts (30 g per day, unsalted)  4  Consume fish at least twice a week, one of which should be an oily fish   5  Limited lean meat, low-fat dairy products, and liquid vegetable oils  6  Saturated FAs should account for less than 5-10% of total energy intake; replace with polyunsaturated fats  7  As little intake of trans unsaturated fats as possible, preferably no intake from processed food, and <1% of total energy intake  <_56 g of salt per day  8  If alcohol is consumed, limiting intake to <_100 g/week or <15 g/day is recommended  9  Avoid energy-dense foods such as sugar-sweetened soft drinks  Recommendation for Physical Activity   All adults should avoid inactivity:    Some physical activity is better than none, and any amount of physical activity results in some health benefits  For substantial health benefits:    150 min per week of moderate-intensity aerobic activity, or    75 min per week of vigorous-intensity aerobic physical activity, or    an equivalent combination of moderate- and vigorous-intensity aerobic activity   Activity episodes should be at least 10 min in duration and spread throughout the week  Combine with moderate- or high-intensity muscle-strengthening activities that involve all major muscle groups on 2 or more days per week  Four principles of weight loss  1  Decrease total energy intake (TEI)  2  Maintain a balanced deficit diet  3  Increase physical activity  4   Adjust energy balance to prevent weight regain

## 2022-02-22 NOTE — PATIENT INSTRUCTIONS
CARDIOLOGY ASSESSMENT & PLAN     1  Paroxysmal atrial fibrillation (HCC)  POCT ECG    TSH, 3rd generation with Free T4 reflex    CBC    Echo complete w/ contrast if indicated    Lipid panel    COMPREHENSIVE METABOLIC HOKPQ(14)    Ambulatory Referral to Bariatric Surgery   2  ILIA (obstructive sleep apnea)  Ambulatory Referral to Sleep Medicine    Ambulatory Referral to Bariatric Surgery   3  Moderate concentric left ventricular hypertrophy  TSH, 3rd generation with Free T4 reflex    CBC    Echo complete w/ contrast if indicated    Lipid panel    COMPREHENSIVE METABOLIC HVHJO(40)   4  Hypertensive heart disease without heart failure  TSH, 3rd generation with Free T4 reflex    CBC    Echo complete w/ contrast if indicated    Lipid panel    COMPREHENSIVE METABOLIC YDCDM(40)   5  Noncompliance with CPAP treatment  Ambulatory Referral to Sleep Medicine   6  Morbid obesity with body mass index of 40 0-49 9 ([facility] [address] )  Ambulatory Referral to Bariatric Surgery     Paroxysmal atrial fibrillation [facility])  Mr Aixa Ramirez seems to be overall stable from cardiac perspective  He did have episode chest pain but description was atypical for angina  Does not look like he is having symptomatic AFib  He continues to be morbidly obese  He has not been compliant with use of CPAP  His previous workup in 2020 had showed moderate concentric left ventricular hypertrophy, normal left ventricular systolic function and no pulmonary hypertension  He was previously on antihypertensive medications but is not been taking them  Blood pressure today is slightly on the higher side  -- at this time I am not initiating any medical therapy  -- I do want him to have some basic blood work and an echocardiogram to reassess cardiac structure and function  --I would like to see him back more time in 3 months and review findings of echocardiogram and consider initiating antihypertensive therapy    -- I am strongly encouraging him to use the CPAP however he will need new evaluation and prescriptions so I am referring him to [address] [place] [address]   -- he expresses interest in weight loss program at [facility]  A referral is being made for the same  -- encouraging him to stay active, eat healthy and try to lose weight  -- I am encouraging him to reestablish follow-up with a primary care physician  HEALTHY LIFESTYLE RECOMMENDATIONS     -    Lifestyle recommendations  SMOKING CESSATION-- Use pharmacological and behavioural strategies to help patients quit smoking  Avoid passive smoking  HEALTHY DIET-- Diet high in vegetables, fruit, and wholegrains  Limit saturated fat to <10% of total intake  Limit alcohol to <100 g/week or 15 g/day  PHYSICAL ACTIVITY-- 30 - 60 min moderate physical activity most days, but even irregular activity is beneficial   HEALTHY WEIGHT-- Obtain and maintain a healthy weight (<25 kg/m2), or reduce weight through recommended energy intake and increased  physical activity  OTHER-- Take medications as prescribed  Healthy diet characteristics  Choose a dietary pattern that emphasizes intake of vegetables, fruit, whole grains, low-fat dairy products, poultry, fish, legumes, nontropical vegetable oils, and nuts and limits the intake of sugar-sweetened beverages and red meats:     1  Increase consumption of fruits and vegetables (>_200 g each per day)  2  35-45 g of fibre per day, preferably from wholegrains  3  Moderate consumption of nuts (30 g per day, unsalted)  4  Consume fish at least twice a week, one of which should be an oily fish   5  Limited lean meat, low-fat dairy products, and liquid vegetable oils  6  Saturated FAs should account for less than 5-10% of total energy intake; replace with polyunsaturated fats  7  As little intake of trans unsaturated fats as possible, preferably no intake from processed food, and <1% of total energy intake  <_56 g of salt per day    8  If alcohol is consumed, limiting intake to <_100 g/week or <15 g/day is recommended  9  Avoid energy-dense foods such as sugar-sweetened soft drinks  Recommendation for Physical Activity   All adults should avoid inactivity:    Some physical activity is better than none, and any amount of physical activity results in some health benefits  For substantial health benefits:    150 min per week of moderate-intensity aerobic activity, or    75 min per week of vigorous-intensity aerobic physical activity, or    an equivalent combination of moderate- and vigorous-intensity aerobic activity   Activity episodes should be at least 10 min in duration and spread throughout the week  Combine with moderate- or high-intensity muscle-strengthening activities that involve all major muscle groups on 2 or more days per week  Four principles of weight loss  1  Decrease total energy intake (TEI)  2  Maintain a balanced deficit diet  3  Increase physical activity  4   Adjust energy balance to prevent weight regain

## 2022-02-22 NOTE — PROGRESS NOTES
[facility]  [address] [facility], [place] [address]  Phone#  [phone]  Fax#  [phone]  *-*-*-*-*-*-*-*-*-*-*-*-*-*-*-*-*-*-*-*-*-*-*-*-*-*-*-*-*-*-*-*-*-*-*-*-*-*-*-*-*-*-*-*-*-*-*-*-*-*-*-*-*-*  [place] DATE: 02/22/22 5:26 PM  PATIENT NAME: Alvaro Martines   1980    [id number]  AGE:41 y o  SEX: male  Maria Dolores Beal MD     PRIMARY CARE PHYSICIAN: Jazmín Anthony MD    DIAGNOSES:  1  Paroxysmal atrial fibrillation with episode in 2017   2  Morbid obesity  3  Obstructive sleep apnea with intermittent compliance with CPAP  4  History of gastritis/ GERD   5  History of splenic infarction  6  History of colonic diverticular disease without history of diverticulitis  7  History of shoulder dislocation  8  History of DVT in right upper extremity, which was apparently related to bee sting  9  H/o Colitis  10  H/o cellulitis    Echocardiogram January 2018 showed technically poor quality study, mild approaching moderate concentric left ventricular hypertrophy, normal left ventricular systolic function, EF around 60%, mild left atrial enlargement, aortic valve sclerosis, trace mitral and tricuspid valve regurgitation, no pulmonary hypertension  Holter monitoring evaluation in June 2018 shows sinus rhythm  beats per minute  No atrial fibrillation was noted during the recording rare isolated premature atrial and premature ventricular contractions were noted  No nonsustained or sustained arrhythmia noted  CURRENT ECG     Results for orders placed or performed in visit on 02/22/22   POCT ECG    Narrative    Sinus rhythm with no significant ST-T wave abnormalities  No evidence of prior infarction, or chamber enlargement or hypertrophy  HR 90 beats per minute, normal axis and intervals  CARDIOLOGY ASSESSMENT & PLAN     1   Paroxysmal atrial fibrillation (HCC)  POCT ECG    TSH, 3rd generation with Free T4 reflex    CBC    Echo complete w/ contrast if indicated Lipid panel    COMPREHENSIVE METABOLIC AOIIH(72)    Ambulatory Referral to Bariatric Surgery   2  ILIA (obstructive sleep apnea)  Ambulatory Referral to Sleep Medicine    Ambulatory Referral to Bariatric Surgery   3  Moderate concentric left ventricular hypertrophy  TSH, 3rd generation with Free T4 reflex    CBC    Echo complete w/ contrast if indicated    Lipid panel    COMPREHENSIVE METABOLIC YLLPJ(86)   4  Hypertensive heart disease without heart failure  TSH, 3rd generation with Free T4 reflex    CBC    Echo complete w/ contrast if indicated    Lipid panel    COMPREHENSIVE METABOLIC PRGUW(45)   5  Noncompliance with CPAP treatment  Ambulatory Referral to Sleep Medicine   6  Morbid obesity with body mass index of 40 0-49 9 ([facility] [address] )  Ambulatory Referral to Bariatric Surgery     Paroxysmal atrial fibrillation [facility])  Mr Noah Gonzalez seems to be overall stable from cardiac perspective  He did have episode chest pain but description was atypical for angina  Does not look like he is having symptomatic AFib  He continues to be morbidly obese  He has not been compliant with use of CPAP  His previous workup in 2020 had showed moderate concentric left ventricular hypertrophy, normal left ventricular systolic function and no pulmonary hypertension  He was previously on antihypertensive medications but is not been taking them  Blood pressure today is slightly on the higher side  -- at this time I am not initiating any medical therapy  -- I do want him to have some basic blood work and an echocardiogram to reassess cardiac structure and function  --I would like to see him back more time in 3 months and review findings of echocardiogram and consider initiating antihypertensive therapy    -- I am strongly encouraging him to use the CPAP however he will need new evaluation and prescriptions so I am referring him to [facility] specialist   -- he expresses interest in weight loss program at [facility]  A referral is being made for the same  -- encouraging him to stay active, eat healthy and try to lose weight  -- I am encouraging him to reestablish follow-up with a primary care physician  HEALTHY LIFESTYLE RECOMMENDATIONS         Lifestyle recommendations  SMOKING CESSATION-- Use pharmacological and behavioural strategies to help patients quit smoking  Avoid passive smoking  HEALTHY DIET-- Diet high in vegetables, fruit, and wholegrains  Limit saturated fat to <10% of total intake  Limit alcohol to <100 g/week or 15 g/day  PHYSICAL ACTIVITY-- 30 - 60 min moderate physical activity most days, but even irregular activity is beneficial   HEALTHY WEIGHT-- Obtain and maintain a healthy weight (<25 kg/m2), or reduce weight through recommended energy intake and increased  physical activity  OTHER-- Take medications as prescribed  Healthy diet characteristics  Choose a dietary pattern that emphasizes intake of vegetables, fruit, whole grains, low-fat dairy products, poultry, fish, legumes, nontropical vegetable oils, and nuts and limits the intake of sugar-sweetened beverages and red meats:     1  Increase consumption of fruits and vegetables (>_200 g each per day)  2  35-45 g of fibre per day, preferably from wholegrains  3  Moderate consumption of nuts (30 g per day, unsalted)  4  Consume fish at least twice a week, one of which should be an oily fish   5  Limited lean meat, low-fat dairy products, and liquid vegetable oils  6  Saturated FAs should account for less than 5-10% of total energy intake; replace with polyunsaturated fats  7  As little intake of trans unsaturated fats as possible, preferably no intake from processed food, and <1% of total energy intake  <_56 g of salt per day  8  If alcohol is consumed, limiting intake to <_100 g/week or <15 g/day is recommended  9  Avoid energy-dense foods such as sugar-sweetened soft drinks        Recommendation for Physical Activity   All adults should avoid inactivity:    Some physical activity is better than none, and any amount of physical activity results in some health benefits  For substantial health benefits:    150 min per week of moderate-intensity aerobic activity, or    75 min per week of vigorous-intensity aerobic physical activity, or    an equivalent combination of moderate- and vigorous-intensity aerobic activity   Activity episodes should be at least 10 min in duration and spread throughout the week  Combine with moderate- or high-intensity muscle-strengthening activities that involve all major muscle groups on 2 or more days per week  Four principles of weight loss  1  Decrease total energy intake (TEI)  2  Maintain a balanced deficit diet  3  Increase physical activity  4  Adjust energy balance to prevent weight regain       INTERVAL HISTORY & HISTORY OF PRESENT ILLNESS     Aliyah Tracey is here for follow-up regarding his cardiac comorbidities which include:  History of paroxysmal atrial fibrillation, morbid obesity, obstructive sleep apnea and other comorbidities  He was last seen by me in June 2020  He is here because he has not had follow-up for some time  And also he recently had an episode which gave him as feeling of possible AFib  He reports that since last visit he has had colitis for which she was evaluated at [facility]  Around January 31, 2021 he noted some left-sided chest wall discomfort in the left lower chest and upper abdomen  Symptoms started suddenly while he was laying down in bed  He checked his rhythm with his heart monitor and it did not indicate significant abnormality however given his previous history of AFib he decided to get himself checked out  He was seen at its [facility] urgent care  Documentation from the visit indicates stable heart rate but elevated blood pressure  ECG showed sinus rhythm  He was discharged from the emergency room    Since that time he has not had recurrence  He does feel some of his symptom is possibly related to history of colitis in November 2021  More recently he has had no typical angina-like chest pain or palpitations or shortness of breath or passing out or near passing out experience  He reports that he is still trying to lose weight and he did lose some weight recently  He has not been using his CPAP machine  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  He has not experienced decline in his exercise tolerance  Denies orthopnea, PND or worsening pedal edema  Functional capacity status:  Good   (Excellent- >10 METs; Good: (7-10 METs); Moderate (4-7 METs); Poor (<= 4 METs)    Any chronic stressors:  None   (feeling of poor health, financial problems, and social isolation etc)  Tobacco or alcohol dependence:  He smokes marijuana for recreational use  Denies cigarette smoking  Drinks socially       Current cardiac medications:  Takes aspirin intermittently  He was previously on metoprolol and losartan which he is not taking  Has not seen a family physician for a long time  Last blood work is from July 2021, renal function was normal, electrolytes are normal, albumin was slightly decreased, white blood cell count was slightly increased  REVIEW OF SYSTEMS   Positive for:  As noted above in HPI  Negative for: All remaining as reviewed below and in HPI      SYSTEM SYMPTOMS REVIEWED:  General--weight change, fever, night sweats  Respiratory--cough, wheezing, shortness of breath, sputum production  Cardiovascular--chest pain, syncope, dyspnea on exertion, edema, decline in exercise tolerance, claudication   Gastrointestinal--persistent vomiting, diarrhea, abdominal distention, blood in stool   Muscular or skeletal--joint pain or swelling   Neurologic--headaches, syncope, abnormal movement  Hematologic--history of easy bruising and bleeding   Endocrine--thyroid enlargement, heat or cold intolerance, polyuria Psychiatric--anxiety, depression     *-*-*-*-*-*-*-*-*-*-*-*-*-*-*-*-*-*-*-*-*-*-*-*-*-*-*-*-*-*-*-*-*-*-*-*-*-*-*-*-*-*-*-*-*-*-*-*-*-*-*-*-*-*-  VITAL SIGNS     CURRENT VITAL SIGNS:   Vitals:    02/22/22 1646   BP: 146/70   Pulse: 90   Weight: (!) 212 kg (467 lb 3 2 oz)   Height: 6' (1 829 m)       BMI: Body mass index is 63 36 kg/m²  WEIGHTS:   Wt Readings from Last 25 Encounters:   02/22/22 (!) 212 kg (467 lb 3 2 oz)   01/31/22 (!) 218 kg (480 lb)   08/30/21 (!) 218 kg (480 lb 3 2 oz)   03/21/21 (!) 217 kg (478 lb)   06/11/20 (!) 226 kg (498 lb)   03/11/20 (!) 213 kg (469 lb)   10/24/19 (!) 206 kg (454 lb)   04/03/19 (!) 204 kg (449 lb 11 2 oz)   12/11/18 (!) 196 kg (433 lb 3 2 oz)   10/03/18 (!) 206 kg (454 lb)   08/29/18 (!) 207 kg (457 lb 4 8 oz)   08/09/16 (!) 212 kg (466 lb 4 4 oz)        *-*-*-*-*-*-*-*-*-*-*-*-*-*-*-*-*-*-*-*-*-*-*-*-*-*-*-*-*-*-*-*-*-*-*-*-*-*-*-*-*-*-*-*-*-*-*-*-*-*-*-*-*-*-  PHYSICAL EXAM     General Appearance:    Alert, cooperative, no distress, appears stated age, large built, morbidly obese   Head, Eyes, ENT:    No obvious abnormality, moist mucous mebranes  Neck:   Supple, no carotid bruit or JVD   Back:     Symmetric, no curvature  Lungs:     Respirations unlabored  decreased breath sounds bilaterally, no wheeze rhonchi or rales noted,    Chest wall:    No tenderness or deformity   Heart:    Regular rate and rhythm, S1 and S2 normal, no murmur, rub  or gallop  Abdomen:     obese, Soft, non-tender,    Extremities:   Extremities warm, no cyanosis or edema    Skin:   mild venostatic changes in lower extremities  Normal skin color, texture, and turgor   No rashes or lesions     *-*-*-*-*-*-*-*-*-*-*-*-*-*-*-*-*-*-*-*-*-*-*-*-*-*-*-*-*-*-*-*-*-*-*-*-*-*-*-*-*-*-*-*-*-*-*-*-*-*-*-*-*-*-  CURRENT MEDICATIONS LIST     Current Outpatient Medications:     amoxicillin (AMOXIL) 500 MG tablet, take 1 tablet by mouth three times a day for 5 to 7 days, Disp: , Rfl:     aspirin 81 MG tablet, Take 81 mg by mouth  , Disp: , Rfl:     chlorhexidine (PERIDEX) 0 12 % solution, swish 15 milliliters by mouth for 30 SECONDS then SPIT use twice a day after meals, Disp: , Rfl:     ibuprofen (MOTRIN) 600 mg tablet, Take 600 mg by mouth every 6 to 8 hours if needed, Disp: , Rfl:     losartan (COZAAR) 25 mg tablet, Take 1 tablet (25 mg total) by mouth daily (Patient not taking: Reported on 8/30/2021), Disp: 30 tablet, Rfl: 6    metoprolol tartrate (LOPRESSOR) 50 mg tablet, Take 2 tablets (100 mg total) by mouth every 12 (twelve) hours (Patient not taking: Reported on 3/21/2021), Disp: 60 tablet, Rfl: 6       ALLERGIES     Allergies   Allergen Reactions    No Active Allergies        *-*-*-*-*-*-*-*-*-*-*-*-*-*-*-*-*-*-*-*-*-*-*-*-*-*-*-*-*-*-*-*-*-*-*-*-*-*-*-*-*-*-*-*-*-*-*-*-*-*-*-*-*-*-  LABORATORY DATA   No results found for: NA  No results found for: NA, K, CL, CO2, ANIONGAP, BUN, CREATININE, EGFR, GLUCOSE, CALCIUM, AST, ALT, ALKPHOS, PROT, BILITOT, MG  No results found for: WBC, HGB, PLT  No results found for: PT, PTT, INR  No results found for: CKMB, DIGOXIN  No results found for: TSH  No results found for: CHOL   No results found for: HGBA1C  No results found for: BLOODCX, SPUTUMCULTUR, GRAMSTAIN, URINECX, WOUNDCULT, BODYFLUIDCUL, MRSACULTURE, INFLUAPCR, INFLUBPCR, RSVPCR, LEGIONELLAUR, CDIFFTOXINB    *-*-*-*-*-*-*-*-*-*-*-*-*-*-*-*-*-*-*-*-*-*-*-*-*-*-*-*-*-*-*-*-*-*-*-*-*-*-*-*-*-*-*-*-*-*-*-*-*-*-*-*-*-*-  PREVIOUS CARDIOLOGY & RADIOLOGY TEST RESULTS   I have personally reviewed pertinent results of cardiovascular tests noted below  No results found for this or any previous visit  No results found for this or any previous visit  No results found for this or any previous visit  No results found for this or any previous visit      EMG 2 Limb Upper Extremity  Marc Jorge MD     3/5/2019  6:31 PM  [facility]  Electromyography and Nerve Conduction Study    Patient Name: Pepper Young  MRN: [id number]   :  1980 Date performed: 3/5/2019    Age: 41 y o  Consult request by: Alverto Leon MD    HISTORY:  Pepper Young is a 41 y o  male who complains of numbness and   tingling in his hands bilaterally, left much worse than right  This has been present for a little less than 1 year  On the   left, the paresthesias affect the dorsum of the hand as well as   the 3rd through 5th digits  On the right, the paresthesias   affect the 5th digit  The paresthesias are present   intermittently  They do not occur nocturnally  The "flick sign"   is positive  When questioned, the patient admits to neck pain   that does not radiate anywhere  He denies upper extremity   weakness  He admits to numbness in 1 of his great toes but   otherwise denies numbness or tingling in his feet or his toes  He is referred to determine the etiology of his symptoms  Past Medical History:   Diagnosis Date    Atrial fibrillation ([facility] [address] )     Blood clot in vein     Cervicalgia     GERD (gastroesophageal reflux disease)     Obstructive sleep apnea     uses CPAP    Occipital neuralgia     right    Shoulder dislocation     Splenic infarction      Current Outpatient Medications:     aspirin 81 MG tablet, Take 81 mg by mouth, Disp: , Rfl:     lisinopril (ZESTRIL) 5 mg tablet, Take 1 tablet (5 mg total)   by mouth daily, Disp: 30 tablet, Rfl: 5    metoprolol tartrate (LOPRESSOR) 100 mg tablet, Take 1 tablet   (100 mg total) by mouth every 12 (twelve) hours, Disp: 60 tablet,   Rfl: 4    rivaroxaban (XARELTO) 20 mg tablet, Take 1 tablet (20 mg   total) by mouth daily with breakfast, Disp: 30 tablet, Rfl: 4    PHYSICAL EXAMINATION:  In general, he was in no acute distress  Upper extremity power   was grade 5 bilaterally, including thumb abduction bilaterally  In both upper extremities, pinprick sensation was equivalent   between the 2nd and 5th digits    Phalen's maneuver was negative   bilaterally  Upper extremity reflexes were bilaterally symmetric   and within normal limits  RESULTS:  EMG/NCS data and waveforms that were performed for this study   have been scanned into Epic  Please refer to scanned document for   waveforms  IMPRESSION:   This is an abnormal study  There is clinical and   electrophysiologic evidence of a mild median neuropathy at the   left wrist (e g  carpal tunnel syndrome), without   electrophysiologic evidence of motor or sensory axon loss  There is no electrophysiologic evidence of a median neuropathy at   the right wrist, despite detailed testing  Clinical correlation is advised  Omar Jacinto MD              *-*-*-*-*-*-*-*-*-*-*-*-*-*-*-*-*-*-*-*-*-*-*-*-*-*-*-*-*-*-*-*-*-*-*-*-*-*-*-*-*-*-*-*-*-*-*-*-*-*-*-*-*-*-  SIGNATURES:   @EVK@   Kaylee Mcclendon MD; [facility]    *-*-*-*-*-*-*-*-*-*-*-*-*-*-*-*-*-*-*-*-*-*-*-*-*-*-*-*-*-*-*-*-*-*-*-*-*-*-*-*-*-*-*-*-*-*-*-*-*-*-*-*-*-*-  PAST MEDICAL HISTORY:  Past Medical History:   Diagnosis Date    Atrial fibrillation ([address] )     Blood clot in vein     Cervicalgia     Depression     GERD (gastroesophageal reflux disease)     Obstructive sleep apnea     uses CPAP    Occipital neuralgia     right    Shoulder dislocation     Splenic infarction     PAST SURGICAL HISTORY:  No past surgical history on file        FAMILY HISTORY:  Family History   Problem Relation Age of Onset    Bleeding Disorder Mother         BLOOD DISORDER    Heart disease Paternal Grandfather         CARDIOVASCULAR DISEASE    Cerebral aneurysm Paternal Grandfather     SOCIAL HISTORY:  Social History     Tobacco Use   Smoking Status Former Smoker    Quit date: 2004    Years since quittin 1   Smokeless Tobacco Former User      Social History     Substance and Sexual Activity   Alcohol Use Yes    Comment: occasional      Social History     Substance and Sexual Activity   Drug Use Yes    Types: Marijuana    Comment: Daily use [unfilled]     *-*-*-*-*-*-*-*-*-*-*-*-*-*-*-*-*-*-*-*-*-*-*-*-*-*-*-*-*-*-*-*-*-*-*-*-*-*-*-*-*-*-*-*-*-*-*-*-*-*-*-*-*-*  ALLERGIES:  Allergies   Allergen Reactions    No Active Allergies     CURRENT SCHEDULED MEDICATIONS:    Current Outpatient Medications:     amoxicillin (AMOXIL) 500 MG tablet, take 1 tablet by mouth three times a day for 5 to 7 days, Disp: , Rfl:     aspirin 81 MG tablet, Take 81 mg by mouth  , Disp: , Rfl:     chlorhexidine (PERIDEX) 0 12 % solution, swish 15 milliliters by mouth for 30 SECONDS then SPIT use twice a day after meals, Disp: , Rfl:     ibuprofen (MOTRIN) 600 mg tablet, Take 600 mg by mouth every 6 to 8 hours if needed, Disp: , Rfl:     losartan (COZAAR) 25 mg tablet, Take 1 tablet (25 mg total) by mouth daily (Patient not taking: Reported on 8/30/2021), Disp: 30 tablet, Rfl: 6    metoprolol tartrate (LOPRESSOR) 50 mg tablet, Take 2 tablets (100 mg total) by mouth every 12 (twelve) hours (Patient not taking: Reported on 3/21/2021), Disp: 60 tablet, Rfl: 6     *-*-*-*-*-*-*-*-*-*-*-*-*-*-*-*-*-*-*-*-*-*-*-*-*-*-*-*-*-*-*-*-*-*-*-*-*-*-*-*-*-*-*-*-*-*-*-*-*-*-*-*-*-*

## 2022-03-17 ENCOUNTER — LAB (OUTPATIENT)
Dept: LAB | Age: 42
End: 2022-03-17
Payer: COMMERCIAL

## 2022-03-17 DIAGNOSIS — Z13.6 SCREENING FOR HYPERTENSION: ICD-10-CM

## 2022-03-17 DIAGNOSIS — I11.9 HYPERTENSIVE HEART DISEASE WITHOUT HEART FAILURE: ICD-10-CM

## 2022-03-17 DIAGNOSIS — I48.0 PAROXYSMAL ATRIAL FIBRILLATION (HCC): ICD-10-CM

## 2022-03-17 DIAGNOSIS — I51.7 MODERATE CONCENTRIC LEFT VENTRICULAR HYPERTROPHY: ICD-10-CM

## 2022-03-17 LAB
ALBUMIN SERPL BCP-MCNC: 3.4 G/DL (ref 3.5–5)
ALP SERPL-CCNC: 91 U/L (ref 46–116)
ALT SERPL W P-5'-P-CCNC: 21 U/L (ref 12–78)
ANION GAP SERPL CALCULATED.3IONS-SCNC: 4 MMOL/L (ref 4–13)
AST SERPL W P-5'-P-CCNC: 16 U/L (ref 5–45)
BASOPHILS # BLD AUTO: 0.04 THOUSANDS/ΜL (ref 0–0.1)
BASOPHILS NFR BLD AUTO: 1 % (ref 0–1)
BILIRUB SERPL-MCNC: 0.42 MG/DL (ref 0.2–1)
BUN SERPL-MCNC: 15 MG/DL (ref 5–25)
CALCIUM ALBUM COR SERPL-MCNC: 9.5 MG/DL (ref 8.3–10.1)
CALCIUM SERPL-MCNC: 9 MG/DL (ref 8.3–10.1)
CHLORIDE SERPL-SCNC: 108 MMOL/L (ref 100–108)
CHOLEST SERPL-MCNC: 153 MG/DL
CO2 SERPL-SCNC: 29 MMOL/L (ref 21–32)
CREAT SERPL-MCNC: 1.02 MG/DL (ref 0.6–1.3)
EOSINOPHIL # BLD AUTO: 0.19 THOUSAND/ΜL (ref 0–0.61)
EOSINOPHIL NFR BLD AUTO: 2 % (ref 0–6)
ERYTHROCYTE [DISTWIDTH] IN BLOOD BY AUTOMATED COUNT: 13.4 % (ref 11.6–15.1)
GFR SERPL CREATININE-BSD FRML MDRD: 91 ML/MIN/1.73SQ M
GLUCOSE P FAST SERPL-MCNC: 97 MG/DL (ref 65–99)
HCT VFR BLD AUTO: 43.7 % (ref 36.5–49.3)
HDLC SERPL-MCNC: 39 MG/DL
HGB BLD-MCNC: 13.4 G/DL (ref 12–17)
IMM GRANULOCYTES # BLD AUTO: 0.03 THOUSAND/UL (ref 0–0.2)
IMM GRANULOCYTES NFR BLD AUTO: 0 % (ref 0–2)
LDLC SERPL CALC-MCNC: 92 MG/DL (ref 0–100)
LYMPHOCYTES # BLD AUTO: 2.43 THOUSANDS/ΜL (ref 0.6–4.47)
LYMPHOCYTES NFR BLD AUTO: 28 % (ref 14–44)
MCH RBC QN AUTO: 28.8 PG (ref 26.8–34.3)
MCHC RBC AUTO-ENTMCNC: 30.7 G/DL (ref 31.4–37.4)
MCV RBC AUTO: 94 FL (ref 82–98)
MONOCYTES # BLD AUTO: 0.6 THOUSAND/ΜL (ref 0.17–1.22)
MONOCYTES NFR BLD AUTO: 7 % (ref 4–12)
NEUTROPHILS # BLD AUTO: 5.56 THOUSANDS/ΜL (ref 1.85–7.62)
NEUTS SEG NFR BLD AUTO: 62 % (ref 43–75)
NONHDLC SERPL-MCNC: 114 MG/DL
NRBC BLD AUTO-RTO: 0 /100 WBCS
PLATELET # BLD AUTO: 281 THOUSANDS/UL (ref 149–390)
PMV BLD AUTO: 10.2 FL (ref 8.9–12.7)
POTASSIUM SERPL-SCNC: 4.3 MMOL/L (ref 3.5–5.3)
PROT SERPL-MCNC: 7.5 G/DL (ref 6.4–8.2)
RBC # BLD AUTO: 4.66 MILLION/UL (ref 3.88–5.62)
SODIUM SERPL-SCNC: 141 MMOL/L (ref 136–145)
TRIGL SERPL-MCNC: 108 MG/DL
TSH SERPL DL<=0.05 MIU/L-ACNC: 3.49 UIU/ML (ref 0.36–3.74)
WBC # BLD AUTO: 8.85 THOUSAND/UL (ref 4.31–10.16)

## 2022-03-17 PROCEDURE — 85025 COMPLETE CBC W/AUTO DIFF WBC: CPT

## 2022-03-17 PROCEDURE — 84443 ASSAY THYROID STIM HORMONE: CPT | Performed by: INTERNAL MEDICINE

## 2022-03-17 PROCEDURE — 80053 COMPREHEN METABOLIC PANEL: CPT

## 2022-03-17 PROCEDURE — 80061 LIPID PANEL: CPT

## 2022-03-17 PROCEDURE — 36415 COLL VENOUS BLD VENIPUNCTURE: CPT

## 2022-03-21 ENCOUNTER — HOSPITAL ENCOUNTER (OUTPATIENT)
Dept: NON INVASIVE DIAGNOSTICS | Facility: HOSPITAL | Age: 42
Discharge: HOME | End: 2022-03-21
Attending: INTERNAL MEDICINE
Payer: COMMERCIAL

## 2022-03-21 VITALS
WEIGHT: 315 LBS | HEART RATE: 70 BPM | SYSTOLIC BLOOD PRESSURE: 146 MMHG | BODY MASS INDEX: 42.66 KG/M2 | HEIGHT: 72 IN | DIASTOLIC BLOOD PRESSURE: 70 MMHG

## 2022-03-21 DIAGNOSIS — I51.7 MODERATE CONCENTRIC LEFT VENTRICULAR HYPERTROPHY: ICD-10-CM

## 2022-03-21 DIAGNOSIS — I48.0 PAROXYSMAL ATRIAL FIBRILLATION (HCC): ICD-10-CM

## 2022-03-21 DIAGNOSIS — I11.9 HYPERTENSIVE HEART DISEASE WITHOUT HEART FAILURE: ICD-10-CM

## 2022-03-21 PROCEDURE — 93306 TTE W/DOPPLER COMPLETE: CPT

## 2022-03-21 RX ADMIN — PERFLUTREN 0.8 ML/MIN: 6.52 INJECTION, SUSPENSION INTRAVENOUS at 09:35

## 2022-03-22 LAB
AORTIC ROOT: 3.2 CM
ASCENDING AORTA: 3.2 CM (ref 2.65–3.97)
E WAVE DECELERATION TIME: 244 MS
FRACTIONAL SHORTENING: 25 % (ref 28–44)
INTERVENTRICULAR SEPTUM IN DIASTOLE (PARASTERNAL SHORT AXIS VIEW): 0.9 CM
INTERVENTRICULAR SEPTUM: 0.9 CM (ref 0.66–1.24)
LAAS-AP2: 22.2 CM2
LAAS-AP4: 21.8 CM2
LEFT ATRIUM SIZE: 4.6 CM
LEFT INTERNAL DIMENSION IN SYSTOLE: 4.7 CM (ref 500.41–759.63)
LEFT VENTRICULAR INTERNAL DIMENSION IN DIASTOLE: 6.3 CM (ref 994.32–1483.33)
LEFT VENTRICULAR POSTERIOR WALL IN END DIASTOLE: 0.9 CM (ref 0.64–1.22)
LEFT VENTRICULAR STROKE VOLUME: 95 ML
LVSV (TEICH): 95 ML
MV E'TISSUE VEL-LAT: 17 CM/S
MV E'TISSUE VEL-SEP: 16 CM/S
MV PEAK A VEL: 0.66 M/S
MV PEAK E VEL: 89 CM/S
MV STENOSIS PRESSURE HALF TIME: 71 MS
MV VALVE AREA P 1/2 METHOD: 3.1 CM2
RIGHT ATRIAL 2D VOLUME: 31 ML
RIGHT ATRIUM AREA SYSTOLE A4C: 14.3 CM2
RIGHT VENTRICLE ID DIMENSION: 3.8 CM
SL CV LEFT ATRIUM LENGTH A2C: 5.5 CM
SL CV LV EF: 55
SL CV PED ECHO LEFT VENTRICLE DIASTOLIC VOLUME (MOD BIPLANE) 2D: 200 ML
SL CV PED ECHO LEFT VENTRICLE SYSTOLIC VOLUME (MOD BIPLANE) 2D: 105 ML
Z-SCORE OF ASCENDING AORTA: -0.32
Z-SCORE OF INTERVENTRICULAR SEPTUM IN END DIASTOLE: -0.32
Z-SCORE OF LEFT VENTRICULAR DIMENSION IN END DIASTOLE: -52.62
Z-SCORE OF LEFT VENTRICULAR DIMENSION IN END SYSTOLE: -38.55
Z-SCORE OF LEFT VENTRICULAR POSTERIOR WALL IN END DIASTOLE: -0.21

## 2022-03-22 PROCEDURE — 93306 TTE W/DOPPLER COMPLETE: CPT | Performed by: INTERNAL MEDICINE

## 2022-03-24 NOTE — RESULT ENCOUNTER NOTE
echocardiogram suggests overall normal heart function  valves of the heart will not well visualized due to body habitus  will continue current medical therapy

## 2022-11-04 ENCOUNTER — APPOINTMENT (OUTPATIENT)
Dept: LAB | Age: 42
End: 2022-11-04

## 2022-11-04 DIAGNOSIS — R19.5 LOOSE BOWEL MOVEMENTS: ICD-10-CM

## 2022-11-04 LAB
IGA SERPL-MCNC: 270 MG/DL (ref 70–400)
TSH SERPL DL<=0.05 MIU/L-ACNC: 3.91 UIU/ML (ref 0.45–4.5)

## 2022-11-05 LAB
KAPPA LC FREE SER-MCNC: 37.7 MG/L (ref 3.3–19.4)
KAPPA LC FREE/LAMBDA FREE SER: 1.3 {RATIO} (ref 0.26–1.65)
LAMBDA LC FREE SERPL-MCNC: 29.1 MG/L (ref 5.7–26.3)

## 2023-03-22 ENCOUNTER — APPOINTMENT (OUTPATIENT)
Dept: RADIOLOGY | Age: 43
End: 2023-03-22

## 2023-03-22 ENCOUNTER — APPOINTMENT (OUTPATIENT)
Dept: LAB | Age: 43
End: 2023-03-22

## 2023-03-22 DIAGNOSIS — I10 BENIGN HYPERTENSION: ICD-10-CM

## 2023-03-22 DIAGNOSIS — R05.3 PERSISTENT COUGH: ICD-10-CM

## 2023-03-22 DIAGNOSIS — Z13.220 LIPID SCREENING: ICD-10-CM

## 2023-03-22 LAB
ALBUMIN SERPL BCP-MCNC: 3.5 G/DL (ref 3.5–5)
ALP SERPL-CCNC: 83 U/L (ref 46–116)
ALT SERPL W P-5'-P-CCNC: 22 U/L (ref 12–78)
ANION GAP SERPL CALCULATED.3IONS-SCNC: -1 MMOL/L (ref 4–13)
AST SERPL W P-5'-P-CCNC: 15 U/L (ref 5–45)
BILIRUB SERPL-MCNC: 0.45 MG/DL (ref 0.2–1)
BUN SERPL-MCNC: 12 MG/DL (ref 5–25)
CALCIUM SERPL-MCNC: 8.8 MG/DL (ref 8.3–10.1)
CHLORIDE SERPL-SCNC: 110 MMOL/L (ref 96–108)
CHOLEST SERPL-MCNC: 160 MG/DL
CO2 SERPL-SCNC: 30 MMOL/L (ref 21–32)
CREAT SERPL-MCNC: 0.91 MG/DL (ref 0.6–1.3)
GFR SERPL CREATININE-BSD FRML MDRD: 104 ML/MIN/1.73SQ M
GLUCOSE P FAST SERPL-MCNC: 102 MG/DL (ref 65–99)
HDLC SERPL-MCNC: 45 MG/DL
LDLC SERPL CALC-MCNC: 95 MG/DL (ref 0–100)
NONHDLC SERPL-MCNC: 115 MG/DL
POTASSIUM SERPL-SCNC: 4.5 MMOL/L (ref 3.5–5.3)
PROT SERPL-MCNC: 7.3 G/DL (ref 6.4–8.4)
SODIUM SERPL-SCNC: 139 MMOL/L (ref 135–147)
TRIGL SERPL-MCNC: 98 MG/DL

## 2023-05-16 ENCOUNTER — OFFICE VISIT (OUTPATIENT)
Dept: URGENT CARE | Age: 43
End: 2023-05-16

## 2023-05-16 VITALS
RESPIRATION RATE: 18 BRPM | TEMPERATURE: 97.3 F | SYSTOLIC BLOOD PRESSURE: 142 MMHG | HEART RATE: 84 BPM | OXYGEN SATURATION: 99 % | DIASTOLIC BLOOD PRESSURE: 88 MMHG

## 2023-05-16 DIAGNOSIS — Z78.9 BODY PIERCING: Primary | ICD-10-CM

## 2023-05-16 DIAGNOSIS — L03.115 CELLULITIS OF RIGHT LOWER EXTREMITY: ICD-10-CM

## 2023-05-16 RX ORDER — AMOXICILLIN AND CLAVULANATE POTASSIUM 875; 125 MG/1; MG/1
1 TABLET, FILM COATED ORAL 2 TIMES DAILY
COMMUNITY
Start: 2023-05-13 | End: 2023-05-23

## 2023-05-16 NOTE — PROGRESS NOTES
[facility]        NAME: Jorge Osman is a 40 y o  male  : 1980    MRN: [id number]  DATE: May 16, 2023  TIME: 12:39 PM    Assessment and Plan   Body piercing [Z78 9]  1  Body piercing        2  Cellulitis of right lower extremity          Patient presents with c/o left upper cheek lump around his piercing  Not a new piercing , denies drainage  Has been using heat  Also has complaint of right foot irritation and drainage at times at new tattoo site  Has been covering it   No fevers  Recently started Augmentin for unrelated issue  PCP also ordered xrays ( has not get gotten)  Discussed continuing abx and obtained xrays  Patient Instructions       Follow up with PCP as needed    Chief Complaint     Chief Complaint   Patient presents with   • Headache     Left sided head lump by piercing  Started yesterday morning  Around piercing, but no drainage  • Foot Pain     New tattoo on right foot  History of Present Illness       Patient presents with c/o left upper cheek lump around his piercing  Not a new piercing , denies drainage  Has been using heat  Also has complaint of right foot irritation and drainage at times at new tattoo site  Has been covering it   No fevers  Recently started Augmentin for unrelated issue  PCP also ordered xrays ( has not get gotten)  Discussed continuing abx and obtained xrays  Review of Systems   Review of Systems   Constitutional: Negative for fever  Skin: Positive for color change and wound  All other systems reviewed and are negative          Current Medications       Current Outpatient Medications:   •  amoxicillin-clavulanate (AUGMENTIN) 875-125 mg per tablet, Take 1 tablet by mouth 2 (two) times a day, Disp: , Rfl:   •  aspirin 81 MG tablet, Take 81 mg by mouth  , Disp: , Rfl:   •  losartan (COZAAR) 25 mg tablet, Take 1 tablet (25 mg total) by mouth daily, Disp: 30 tablet, Rfl: 6  •  amoxicillin (AMOXIL) 500 MG tablet, take 1 tablet by mouth three times a day for 5 to 7 days (Patient not taking: Reported on 5/16/2023), Disp: , Rfl:   •  chlorhexidine (PERIDEX) 0 12 % solution, swish 15 milliliters by mouth for 30 SECONDS then SPIT use twice a day after meals, Disp: , Rfl:   •  ibuprofen (MOTRIN) 600 mg tablet, Take 600 mg by mouth every 6 to 8 hours if needed, Disp: , Rfl:   •  metoprolol tartrate (LOPRESSOR) 50 mg tablet, Take 2 tablets (100 mg total) by mouth every 12 (twelve) hours (Patient not taking: Reported on 3/21/2021), Disp: 60 tablet, Rfl: 6    Current Allergies     Allergies as of 05/16/2023 - Reviewed 05/16/2023   Allergen Reaction Noted   • No active allergies  07/05/2018            The following portions of the patient's history were reviewed and updated as appropriate: allergies, current medications, past family history, past medical history, past social history, past surgical history and problem list      Past Medical History:   Diagnosis Date   • Atrial fibrillation ([facility] [address] )    • Blood clot in vein    • Cervicalgia    • Depression    • GERD (gastroesophageal reflux disease)    • Obstructive sleep apnea     uses CPAP   • Occipital neuralgia     right   • Shoulder dislocation    • Splenic infarction        No past surgical history on file  Family History   Problem Relation Age of Onset   • Bleeding Disorder Mother         BLOOD DISORDER   • Heart disease Paternal Grandfather         CARDIOVASCULAR DISEASE   • Cerebral aneurysm Paternal Grandfather          Medications have been verified  Objective   /88   Pulse 84   Temp (!) 97 3 °F (36 3 °C)   Resp 18   SpO2 99%   No LMP for male patient  Physical Exam     Physical Exam  Vitals reviewed  Skin:     Capillary Refill: Capillary refill takes less than 2 seconds  Findings: Erythema present  Comments: Hard lump noted around left facial piercing  Cellulitis noted to top of right foot

## 2023-10-18 ENCOUNTER — OFFICE VISIT (OUTPATIENT)
Dept: URGENT CARE | Age: 43
End: 2023-10-18
Payer: COMMERCIAL

## 2023-10-18 VITALS
SYSTOLIC BLOOD PRESSURE: 180 MMHG | DIASTOLIC BLOOD PRESSURE: 100 MMHG | OXYGEN SATURATION: 98 % | TEMPERATURE: 97.1 F | HEART RATE: 70 BPM | RESPIRATION RATE: 18 BRPM

## 2023-10-18 DIAGNOSIS — J40 BRONCHITIS: Primary | ICD-10-CM

## 2023-10-18 PROCEDURE — 99213 OFFICE O/P EST LOW 20 MIN: CPT

## 2023-10-18 RX ORDER — ALBUTEROL SULFATE 90 UG/1
1 AEROSOL, METERED RESPIRATORY (INHALATION) EVERY 4 HOURS PRN
COMMUNITY
Start: 2023-03-02 | End: 2024-03-01

## 2023-10-18 RX ORDER — AZITHROMYCIN 250 MG/1
TABLET, FILM COATED ORAL
Qty: 6 TABLET | Refills: 0 | Status: SHIPPED | OUTPATIENT
Start: 2023-10-18 | End: 2023-10-22

## 2023-10-18 RX ORDER — METHYLPREDNISOLONE 4 MG/1
TABLET ORAL
Qty: 21 TABLET | Refills: 0 | Status: SHIPPED | OUTPATIENT
Start: 2023-10-18

## 2023-10-18 NOTE — PROGRESS NOTES
[facility]        NAME: Sierra Mcdonnell is a 40 y.o. male  : 1980    MRN: [id number]  DATE: 2023  TIME: 2:17 PM    Assessment and Plan   Bronchitis [J40]  1. Bronchitis  azithromycin (ZITHROMAX) 250 mg tablet    methylPREDNISolone 4 MG tablet therapy pack        Pt presents for eval of cough, congestion, wheezing at times. Was sick last week- states less congestion however he lost his voice and chest feels tight at times with cough/wheeze. Has inhaler from prior illness- Denies fevers. Demonstrated proper inhaler use. Discussed BP ( reading is not normal for him, prior readings reviewed). Patient Instructions       Follow up with PCP as needed    Chief Complaint     Chief Complaint   Patient presents with    Sore Throat     Head cold last week. Loss of voice, sore throat Sx for 5 days. Nothing OTC. Pt also mentions he had a party 5 nights ago and was screaming a lot and throat has been irritated since then. History of Present Illness       Pt presents for eval of cough, congestion, wheezing at times. Was sick last week- states less congestion however he lost his voice and chest feels tight at times with cough/wheeze. Has inhaler from prior illness- Denies fevers. Demonstrated proper inhaler use. Discussed BP ( reading is not normal for him, prior readings reviewed). Review of Systems   Review of Systems   Constitutional:  Negative for activity change and fever. HENT:  Positive for congestion, postnasal drip, sore throat and voice change. Respiratory:  Positive for cough and wheezing.           Current Medications       Current Outpatient Medications:     albuterol (PROVENTIL HFA,VENTOLIN HFA) 90 mcg/act inhaler, Inhale 1 puff every 4 (four) hours as needed, Disp: , Rfl:     azithromycin (ZITHROMAX) 250 mg tablet, Take 2 tablets today then 1 tablet daily x 4 days, Disp: 6 tablet, Rfl: 0    methylPREDNISolone 4 MG tablet therapy pack, Use as directed on package, Disp: 21 tablet, Rfl: 0    amoxicillin (AMOXIL) 500 MG tablet, take 1 tablet by mouth three times a day for 5 to 7 days (Patient not taking: Reported on 5/16/2023), Disp: , Rfl:     aspirin 81 MG tablet, Take 81 mg by mouth   (Patient not taking: Reported on 10/18/2023), Disp: , Rfl:     chlorhexidine (PERIDEX) 0.12 % solution, swish 15 milliliters by mouth for 30 SECONDS then SPIT use twice a day after meals (Patient not taking: Reported on 10/18/2023), Disp: , Rfl:     ibuprofen (MOTRIN) 600 mg tablet, Take 600 mg by mouth every 6 to 8 hours if needed (Patient not taking: Reported on 10/18/2023), Disp: , Rfl:     losartan (COZAAR) 25 mg tablet, Take 1 tablet (25 mg total) by mouth daily (Patient not taking: Reported on 10/18/2023), Disp: 30 tablet, Rfl: 6    metoprolol tartrate (LOPRESSOR) 50 mg tablet, Take 2 tablets (100 mg total) by mouth every 12 (twelve) hours (Patient not taking: Reported on 3/21/2021), Disp: 60 tablet, Rfl: 6    Current Allergies     Allergies as of 10/18/2023 - Reviewed 10/18/2023   Allergen Reaction Noted    No active allergies  07/05/2018            The following portions of the patient's history were reviewed and updated as appropriate: allergies, current medications, past family history, past medical history, past social history, past surgical history and problem list.     Past Medical History:   Diagnosis Date    Atrial fibrillation ([address])     Blood clot in vein     Cervicalgia     Depression     GERD (gastroesophageal reflux disease)     Obstructive sleep apnea     uses CPAP    Occipital neuralgia     right    Shoulder dislocation     Splenic infarction        No past surgical history on file. Family History   Problem Relation Age of Onset    Bleeding Disorder Mother         BLOOD DISORDER    Heart disease Paternal Grandfather         CARDIOVASCULAR DISEASE    Cerebral aneurysm Paternal Grandfather          Medications have been verified.         Objective   BP (!) 180/100 (BP Location: Left arm, Patient Position: Sitting, Cuff Size: Large)   Pulse 70   Temp (!) 97.1 °F (36.2 °C) (Tympanic)   Resp 18   SpO2 98%   No LMP for male patient. Physical Exam     Physical Exam  Vitals reviewed. Constitutional:       Appearance: He is well-developed. HENT:      Right Ear: Tympanic membrane normal.      Left Ear: Tympanic membrane normal.      Mouth/Throat:      Pharynx: Posterior oropharyngeal erythema present. Tonsils: No tonsillar exudate. Cardiovascular:      Rate and Rhythm: Normal rate and regular rhythm. Pulmonary:      Breath sounds: Wheezing present. Neurological:      Mental Status: He is alert.